# Patient Record
Sex: FEMALE | Race: WHITE | URBAN - METROPOLITAN AREA
[De-identification: names, ages, dates, MRNs, and addresses within clinical notes are randomized per-mention and may not be internally consistent; named-entity substitution may affect disease eponyms.]

---

## 2018-04-26 ENCOUNTER — EMPLOYEE HEALTH (OUTPATIENT)
Dept: OCCUPATIONAL MEDICINE | Age: 28
End: 2018-04-26
Attending: PHYSICIAN ASSISTANT

## 2022-01-18 ENCOUNTER — NURSE ONLY (OUTPATIENT)
Dept: INTERNAL MEDICINE CLINIC | Facility: HOSPITAL | Age: 32
End: 2022-01-18
Attending: EMERGENCY MEDICINE

## 2022-01-18 DIAGNOSIS — Z00.00 WELLNESS EXAMINATION: Primary | ICD-10-CM

## 2022-01-18 PROCEDURE — 86480 TB TEST CELL IMMUN MEASURE: CPT

## 2022-01-20 LAB
M TB IFN-G CD4+ T-CELLS BLD-ACNC: 0 IU/ML
M TB TUBERC IFN-G BLD QL: NEGATIVE
M TB TUBERC IGNF/MITOGEN IGNF CONTROL: >10 IU/ML
QFT TB1 AG MINUS NIL: 0.02 IU/ML
QFT TB2 AG MINUS NIL: 0 IU/ML

## 2022-03-17 ENCOUNTER — OFFICE VISIT (OUTPATIENT)
Dept: FAMILY MEDICINE CLINIC | Facility: CLINIC | Age: 32
End: 2022-03-17
Payer: COMMERCIAL

## 2022-03-17 VITALS
HEART RATE: 72 BPM | RESPIRATION RATE: 16 BRPM | WEIGHT: 176 LBS | TEMPERATURE: 98 F | DIASTOLIC BLOOD PRESSURE: 70 MMHG | OXYGEN SATURATION: 98 % | BODY MASS INDEX: 28.28 KG/M2 | SYSTOLIC BLOOD PRESSURE: 110 MMHG | HEIGHT: 66 IN

## 2022-03-17 DIAGNOSIS — Z00.00 LABORATORY TESTS ORDERED AS PART OF A COMPLETE PHYSICAL EXAM (CPE): ICD-10-CM

## 2022-03-17 DIAGNOSIS — R80.2: ICD-10-CM

## 2022-03-17 DIAGNOSIS — Z76.89 ENCOUNTER TO ESTABLISH CARE: ICD-10-CM

## 2022-03-17 DIAGNOSIS — Z00.00 WELL ADULT EXAM: Primary | ICD-10-CM

## 2022-03-17 PROCEDURE — 3074F SYST BP LT 130 MM HG: CPT | Performed by: FAMILY MEDICINE

## 2022-03-17 PROCEDURE — 99385 PREV VISIT NEW AGE 18-39: CPT | Performed by: FAMILY MEDICINE

## 2022-03-17 PROCEDURE — 3078F DIAST BP <80 MM HG: CPT | Performed by: FAMILY MEDICINE

## 2022-03-17 PROCEDURE — 3008F BODY MASS INDEX DOCD: CPT | Performed by: FAMILY MEDICINE

## 2022-04-19 ENCOUNTER — LAB ENCOUNTER (OUTPATIENT)
Dept: LAB | Age: 32
End: 2022-04-19
Attending: FAMILY MEDICINE
Payer: COMMERCIAL

## 2022-04-19 DIAGNOSIS — Z00.00 WELL ADULT EXAM: ICD-10-CM

## 2022-04-19 DIAGNOSIS — Z00.00 LABORATORY TESTS ORDERED AS PART OF A COMPLETE PHYSICAL EXAM (CPE): ICD-10-CM

## 2022-04-19 LAB
ALBUMIN SERPL-MCNC: 3.7 G/DL (ref 3.4–5)
ALBUMIN/GLOB SERPL: 1.2 {RATIO} (ref 1–2)
ALP LIVER SERPL-CCNC: 54 U/L
ALT SERPL-CCNC: 21 U/L
ANION GAP SERPL CALC-SCNC: 5 MMOL/L (ref 0–18)
AST SERPL-CCNC: 10 U/L (ref 15–37)
BASOPHILS # BLD AUTO: 0.04 X10(3) UL (ref 0–0.2)
BASOPHILS NFR BLD AUTO: 0.6 %
BILIRUB SERPL-MCNC: 0.7 MG/DL (ref 0.1–2)
BUN BLD-MCNC: 8 MG/DL (ref 7–18)
CALCIUM BLD-MCNC: 9.3 MG/DL (ref 8.5–10.1)
CHLORIDE SERPL-SCNC: 109 MMOL/L (ref 98–112)
CHOLEST SERPL-MCNC: 143 MG/DL (ref ?–200)
CO2 SERPL-SCNC: 25 MMOL/L (ref 21–32)
CREAT BLD-MCNC: 0.67 MG/DL
EOSINOPHIL # BLD AUTO: 0.07 X10(3) UL (ref 0–0.7)
EOSINOPHIL NFR BLD AUTO: 1 %
ERYTHROCYTE [DISTWIDTH] IN BLOOD BY AUTOMATED COUNT: 12.4 %
FASTING PATIENT LIPID ANSWER: YES
FASTING STATUS PATIENT QL REPORTED: YES
GLOBULIN PLAS-MCNC: 3.2 G/DL (ref 2.8–4.4)
GLUCOSE BLD-MCNC: 94 MG/DL (ref 70–99)
HCT VFR BLD AUTO: 44 %
HDLC SERPL-MCNC: 63 MG/DL (ref 40–59)
HGB BLD-MCNC: 13.9 G/DL
IMM GRANULOCYTES # BLD AUTO: 0.03 X10(3) UL (ref 0–1)
IMM GRANULOCYTES NFR BLD: 0.4 %
LDLC SERPL CALC-MCNC: 69 MG/DL (ref ?–100)
LYMPHOCYTES # BLD AUTO: 2.42 X10(3) UL (ref 1–4)
LYMPHOCYTES NFR BLD AUTO: 33.8 %
MCH RBC QN AUTO: 30.1 PG (ref 26–34)
MCHC RBC AUTO-ENTMCNC: 31.6 G/DL (ref 31–37)
MCV RBC AUTO: 95.2 FL
MONOCYTES # BLD AUTO: 0.61 X10(3) UL (ref 0.1–1)
MONOCYTES NFR BLD AUTO: 8.5 %
NEUTROPHILS # BLD AUTO: 4 X10 (3) UL (ref 1.5–7.7)
NEUTROPHILS # BLD AUTO: 4 X10(3) UL (ref 1.5–7.7)
NEUTROPHILS NFR BLD AUTO: 55.7 %
NONHDLC SERPL-MCNC: 80 MG/DL (ref ?–130)
OSMOLALITY SERPL CALC.SUM OF ELEC: 286 MOSM/KG (ref 275–295)
PLATELET # BLD AUTO: 295 10(3)UL (ref 150–450)
POTASSIUM SERPL-SCNC: 4.5 MMOL/L (ref 3.5–5.1)
PROT SERPL-MCNC: 6.9 G/DL (ref 6.4–8.2)
RBC # BLD AUTO: 4.62 X10(6)UL
SODIUM SERPL-SCNC: 139 MMOL/L (ref 136–145)
T4 FREE SERPL-MCNC: 1.1 NG/DL (ref 0.8–1.7)
TRIGL SERPL-MCNC: 47 MG/DL (ref 30–149)
TSI SER-ACNC: 1.8 MIU/ML (ref 0.36–3.74)
VLDLC SERPL CALC-MCNC: 7 MG/DL (ref 0–30)
WBC # BLD AUTO: 7.2 X10(3) UL (ref 4–11)

## 2022-04-19 PROCEDURE — 84439 ASSAY OF FREE THYROXINE: CPT

## 2022-04-19 PROCEDURE — 85025 COMPLETE CBC W/AUTO DIFF WBC: CPT

## 2022-04-19 PROCEDURE — 84443 ASSAY THYROID STIM HORMONE: CPT

## 2022-04-19 PROCEDURE — 36415 COLL VENOUS BLD VENIPUNCTURE: CPT

## 2022-04-19 PROCEDURE — 80053 COMPREHEN METABOLIC PANEL: CPT

## 2022-04-19 PROCEDURE — 80061 LIPID PANEL: CPT

## 2022-07-12 ENCOUNTER — OFFICE VISIT (OUTPATIENT)
Dept: OBGYN CLINIC | Facility: CLINIC | Age: 32
End: 2022-07-12
Payer: COMMERCIAL

## 2022-07-12 VITALS
BODY MASS INDEX: 28.19 KG/M2 | HEART RATE: 114 BPM | SYSTOLIC BLOOD PRESSURE: 122 MMHG | DIASTOLIC BLOOD PRESSURE: 78 MMHG | HEIGHT: 66 IN | WEIGHT: 175.38 LBS

## 2022-07-12 DIAGNOSIS — N89.8 VAGINAL DISCHARGE: ICD-10-CM

## 2022-07-12 DIAGNOSIS — Z01.419 ENCOUNTER FOR WELL WOMAN EXAM WITH ROUTINE GYNECOLOGICAL EXAM: Primary | ICD-10-CM

## 2022-07-12 DIAGNOSIS — Z30.09 ENCOUNTER FOR COUNSELING REGARDING CONTRACEPTION: ICD-10-CM

## 2022-07-12 DIAGNOSIS — Z12.4 SCREENING FOR CERVICAL CANCER: ICD-10-CM

## 2022-07-12 PROCEDURE — 87480 CANDIDA DNA DIR PROBE: CPT | Performed by: OBSTETRICS & GYNECOLOGY

## 2022-07-12 PROCEDURE — 3008F BODY MASS INDEX DOCD: CPT | Performed by: OBSTETRICS & GYNECOLOGY

## 2022-07-12 PROCEDURE — 87660 TRICHOMONAS VAGIN DIR PROBE: CPT | Performed by: OBSTETRICS & GYNECOLOGY

## 2022-07-12 PROCEDURE — 87624 HPV HI-RISK TYP POOLED RSLT: CPT | Performed by: OBSTETRICS & GYNECOLOGY

## 2022-07-12 PROCEDURE — 99385 PREV VISIT NEW AGE 18-39: CPT | Performed by: OBSTETRICS & GYNECOLOGY

## 2022-07-12 PROCEDURE — 3078F DIAST BP <80 MM HG: CPT | Performed by: OBSTETRICS & GYNECOLOGY

## 2022-07-12 PROCEDURE — 87510 GARDNER VAG DNA DIR PROBE: CPT | Performed by: OBSTETRICS & GYNECOLOGY

## 2022-07-12 PROCEDURE — 3074F SYST BP LT 130 MM HG: CPT | Performed by: OBSTETRICS & GYNECOLOGY

## 2022-07-12 RX ORDER — LEVOCETIRIZINE DIHYDROCHLORIDE 5 MG/1
TABLET, FILM COATED ORAL
COMMUNITY
Start: 2022-06-01

## 2022-07-13 LAB — HPV I/H RISK 1 DNA SPEC QL NAA+PROBE: NEGATIVE

## 2022-07-19 PROBLEM — R87.612 LGSIL ON PAP SMEAR OF CERVIX: Status: ACTIVE | Noted: 2022-07-19

## 2022-09-07 ENCOUNTER — TELEPHONE (OUTPATIENT)
Dept: OBGYN CLINIC | Facility: CLINIC | Age: 32
End: 2022-09-07

## 2022-09-07 NOTE — TELEPHONE ENCOUNTER
Patient stated she is having some bleeding and noticed bumps in vaginal area.  Please give patient a call

## 2022-09-19 ENCOUNTER — TELEPHONE (OUTPATIENT)
Dept: INTERNAL MEDICINE CLINIC | Facility: HOSPITAL | Age: 32
End: 2022-09-19

## 2022-09-19 NOTE — TELEPHONE ENCOUNTER
9/19 Dashboard submission reporting exposure to patient on 9/15 who tested positive for covid. Employee is asymptomatic and fully vaccinated and boosted so no testing required per policy unless symptoms develop. 3

## 2022-11-02 ENCOUNTER — TELEPHONE (OUTPATIENT)
Dept: FAMILY MEDICINE CLINIC | Facility: CLINIC | Age: 32
End: 2022-11-02

## 2022-11-02 NOTE — TELEPHONE ENCOUNTER
Pt dropped of a Health Screening Form to be completed by the doctor. She had her Annual on 3/17/22. The form was placed in Dr. Jojo Kolb folder.

## 2022-11-03 NOTE — TELEPHONE ENCOUNTER
LMOM for pt that form was completed by physician. Copy faxed to [phone]. Copy placed in blue accordion by  copy/fax machine, and copy placed in patient .

## 2022-11-28 ENCOUNTER — LAB ENCOUNTER (OUTPATIENT)
Dept: LAB | Facility: HOSPITAL | Age: 32
End: 2022-11-28
Attending: PREVENTIVE MEDICINE
Payer: COMMERCIAL

## 2022-11-28 ENCOUNTER — TELEPHONE (OUTPATIENT)
Dept: INTERNAL MEDICINE CLINIC | Facility: HOSPITAL | Age: 32
End: 2022-11-28

## 2022-11-28 DIAGNOSIS — Z20.822 SUSPECTED COVID-19 VIRUS INFECTION: ICD-10-CM

## 2022-11-28 DIAGNOSIS — Z20.822 SUSPECTED COVID-19 VIRUS INFECTION: Primary | ICD-10-CM

## 2022-11-28 LAB — SARS-COV-2 RNA RESP QL NAA+PROBE: NOT DETECTED

## 2022-11-29 NOTE — TELEPHONE ENCOUNTER
Results and RTW guidelines:    COVID RESULT:    [x] Viewed by employee in [address] RTW plan and instructions as indicated on triage call. Manager notified. Estimated RTW date: 11/28/2022  [] Discussed with employee   [] Unable to reach by phone.   Sent via [employer] message      Test type:    [x] Rapid         [] Alinity         [] Outside test:       [x] NEGATIVE     Ordered Alinity retest?  []Yes   [x] No (skip to RTW)   Ordered Rapid retest?   []Yes   [x] No (skip to RTW)           Dated to be taken:      If Yes, PLACE ORDER NOW and instruct the following:  -Originally Symptomatic or Now Symptoms:   -RTW when sx improve- fever free for 24 hours w/o medications, Diarrhea/Vomiting for 24 hours w/o medications    -Originally  Asymptomatic  -Asymptomatic AND Vaccinated or Unvaccinated or Prior infection in past 90 days:   -May work and continue to monitor symptoms for the next 14 days.                                         -Rapid test day 2, rapid test day 5 (day 0 - exposure)

## 2022-12-18 ENCOUNTER — OFFICE VISIT (OUTPATIENT)
Dept: FAMILY MEDICINE CLINIC | Facility: CLINIC | Age: 32
End: 2022-12-18
Payer: COMMERCIAL

## 2022-12-18 VITALS
BODY MASS INDEX: 27.32 KG/M2 | DIASTOLIC BLOOD PRESSURE: 80 MMHG | RESPIRATION RATE: 18 BRPM | OXYGEN SATURATION: 98 % | HEART RATE: 94 BPM | HEIGHT: 66 IN | TEMPERATURE: 98 F | SYSTOLIC BLOOD PRESSURE: 118 MMHG | WEIGHT: 170 LBS

## 2022-12-18 DIAGNOSIS — J01.00 ACUTE NON-RECURRENT MAXILLARY SINUSITIS: ICD-10-CM

## 2022-12-18 DIAGNOSIS — R05.1 ACUTE COUGH: Primary | ICD-10-CM

## 2022-12-18 PROCEDURE — 3008F BODY MASS INDEX DOCD: CPT | Performed by: FAMILY MEDICINE

## 2022-12-18 PROCEDURE — 87637 SARSCOV2&INF A&B&RSV AMP PRB: CPT | Performed by: FAMILY MEDICINE

## 2022-12-18 PROCEDURE — 99213 OFFICE O/P EST LOW 20 MIN: CPT | Performed by: FAMILY MEDICINE

## 2022-12-18 PROCEDURE — 3074F SYST BP LT 130 MM HG: CPT | Performed by: FAMILY MEDICINE

## 2022-12-18 PROCEDURE — 3079F DIAST BP 80-89 MM HG: CPT | Performed by: FAMILY MEDICINE

## 2022-12-18 RX ORDER — ALBUTEROL SULFATE 90 UG/1
2 AEROSOL, METERED RESPIRATORY (INHALATION) EVERY 4 HOURS PRN
Qty: 18 G | Refills: 0 | Status: SHIPPED | OUTPATIENT
Start: 2022-12-18

## 2022-12-18 RX ORDER — CEPHALEXIN 500 MG/1
500 CAPSULE ORAL 2 TIMES DAILY
Qty: 20 CAPSULE | Refills: 0 | Status: SHIPPED | OUTPATIENT
Start: 2022-12-18 | End: 2022-12-19

## 2022-12-18 NOTE — PATIENT INSTRUCTIONS
Take antibiotics with food and plenty of water. Eat yogurt or take probiotic daily. (Flacada Model is a good example of an OTC probiotic)  Make sure to finish the entire antibiotic treatment. Increase fluids and rest.   Use albuterol inhaler as needed for cough/chest heaviness. Use OTC meds for comfort as needed--  Ibuprofen/Tylenol for fever/pain  Use Benadryl at bedtime to reduce drainage and promote rest.  Zyrtec/Claritin/Allegra in the AM to reduce nasal drainage without sedation. Use saline nasal sprays to reduce congestion and thin secretions. Use Delsym for cough. Consider applying jodie's vapo-rub or eucayptus oil to chest and feet at bedtime to reduce chest and nasal congestion. Warm tea with honey, cough lozenges, vaporizers/steam etc.    Monitor symptoms and contact the office if no better in 2-3 days.

## 2022-12-19 LAB
FLUAV + FLUBV RNA SPEC NAA+PROBE: NOT DETECTED
FLUAV + FLUBV RNA SPEC NAA+PROBE: NOT DETECTED
RSV RNA SPEC NAA+PROBE: NOT DETECTED
SARS-COV-2 RNA RESP QL NAA+PROBE: DETECTED

## 2022-12-19 RX ORDER — DOXYCYCLINE HYCLATE 100 MG/1
100 CAPSULE ORAL 2 TIMES DAILY
Qty: 20 CAPSULE | Refills: 0 | Status: SHIPPED | OUTPATIENT
Start: 2022-12-19 | End: 2022-12-29

## 2022-12-19 NOTE — PROGRESS NOTES
Pt with rash after taking keflex. Pt to stop keflex. Has been taking benadryl. No swelling of mouth or tongue, no breathing problems. Will rx doxycycline. Pt will wait for a few days and see if she feels abx are necessary. \  Will use otcs for now and monitor. Quad panel is pending.

## 2022-12-20 ENCOUNTER — TELEPHONE (OUTPATIENT)
Dept: INTERNAL MEDICINE CLINIC | Facility: HOSPITAL | Age: 32
End: 2022-12-20

## 2022-12-22 ENCOUNTER — PATIENT MESSAGE (OUTPATIENT)
Dept: FAMILY MEDICINE CLINIC | Facility: CLINIC | Age: 32
End: 2022-12-22

## 2022-12-23 NOTE — TELEPHONE ENCOUNTER
From: Livia Chan  To: Meagan Sales DO  Sent: 12/22/2022 6:58 PM CST  Subject: Medication    Hi Janneth Gomez wondering if you have any medication suggestions for a cough Shruthi had for the past month. I was sick the week after Thanksgiving (rapid covid was negative) I felt better overall by the end of the week, but since then continued to have a consistent cough with phlegm (nothing actually comes up when I cough). I went to walk-in care on 12/18 because I also started having shortness of breath, sore throat and runny nose. I was recommended Delsym and allergy medication to help with the cough. I tested positive for covid this time. My other symptoms have resolved and while the medications have helped suppress my cough, it is still there. Should I continue taking Delsym/allergy medications or is there something else you recommend?     Thank you,    Livia Chan

## 2022-12-27 ENCOUNTER — TELEMEDICINE (OUTPATIENT)
Dept: TELEHEALTH | Age: 32
End: 2022-12-27

## 2022-12-27 DIAGNOSIS — Z86.16 HISTORY OF COVID-19: ICD-10-CM

## 2022-12-27 DIAGNOSIS — R05.8 POST-VIRAL COUGH SYNDROME: Primary | ICD-10-CM

## 2022-12-27 PROCEDURE — 99213 OFFICE O/P EST LOW 20 MIN: CPT | Performed by: PHYSICIAN ASSISTANT

## 2022-12-27 RX ORDER — BENZONATATE 200 MG/1
200 CAPSULE ORAL 3 TIMES DAILY PRN
Qty: 30 CAPSULE | Refills: 0 | Status: SHIPPED | OUTPATIENT
Start: 2022-12-27

## 2022-12-27 RX ORDER — PREDNISONE 20 MG/1
40 TABLET ORAL DAILY
Qty: 10 TABLET | Refills: 0 | Status: SHIPPED | OUTPATIENT
Start: 2022-12-27 | End: 2023-01-01

## 2023-05-01 ENCOUNTER — TELEPHONE (OUTPATIENT)
Dept: FAMILY MEDICINE CLINIC | Facility: CLINIC | Age: 33
End: 2023-05-01

## 2023-05-01 DIAGNOSIS — Z00.00 LABORATORY EXAMINATION ORDERED AS PART OF A ROUTINE GENERAL MEDICAL EXAMINATION: Primary | ICD-10-CM

## 2023-05-03 ENCOUNTER — LAB ENCOUNTER (OUTPATIENT)
Dept: LAB | Age: 33
End: 2023-05-03
Attending: FAMILY MEDICINE
Payer: COMMERCIAL

## 2023-05-03 DIAGNOSIS — Z00.00 LABORATORY EXAMINATION ORDERED AS PART OF A ROUTINE GENERAL MEDICAL EXAMINATION: ICD-10-CM

## 2023-05-03 LAB
ALBUMIN SERPL-MCNC: 3.2 G/DL (ref 3.4–5)
ALBUMIN/GLOB SERPL: 0.8 {RATIO} (ref 1–2)
ALP LIVER SERPL-CCNC: 46 U/L
ALT SERPL-CCNC: 19 U/L
ANION GAP SERPL CALC-SCNC: 3 MMOL/L (ref 0–18)
AST SERPL-CCNC: 14 U/L (ref 15–37)
BASOPHILS # BLD AUTO: 0.06 X10(3) UL (ref 0–0.2)
BASOPHILS NFR BLD AUTO: 0.6 %
BILIRUB SERPL-MCNC: 0.3 MG/DL (ref 0.1–2)
BUN BLD-MCNC: 11 MG/DL (ref 7–18)
CALCIUM BLD-MCNC: 9 MG/DL (ref 8.5–10.1)
CHLORIDE SERPL-SCNC: 111 MMOL/L (ref 98–112)
CHOLEST SERPL-MCNC: 186 MG/DL (ref ?–200)
CO2 SERPL-SCNC: 22 MMOL/L (ref 21–32)
CREAT BLD-MCNC: 0.7 MG/DL
EOSINOPHIL # BLD AUTO: 0.14 X10(3) UL (ref 0–0.7)
EOSINOPHIL NFR BLD AUTO: 1.5 %
ERYTHROCYTE [DISTWIDTH] IN BLOOD BY AUTOMATED COUNT: 12.1 %
FASTING PATIENT LIPID ANSWER: YES
FASTING STATUS PATIENT QL REPORTED: YES
GFR SERPLBLD BASED ON 1.73 SQ M-ARVRAT: 118 ML/MIN/1.73M2 (ref 60–?)
GLOBULIN PLAS-MCNC: 3.8 G/DL (ref 2.8–4.4)
GLUCOSE BLD-MCNC: 100 MG/DL (ref 70–99)
HCT VFR BLD AUTO: 41.1 %
HDLC SERPL-MCNC: 83 MG/DL (ref 40–59)
HGB BLD-MCNC: 13.6 G/DL
IMM GRANULOCYTES # BLD AUTO: 0.03 X10(3) UL (ref 0–1)
IMM GRANULOCYTES NFR BLD: 0.3 %
LDLC SERPL CALC-MCNC: 87 MG/DL (ref ?–100)
LYMPHOCYTES # BLD AUTO: 3.92 X10(3) UL (ref 1–4)
LYMPHOCYTES NFR BLD AUTO: 41.4 %
MCH RBC QN AUTO: 30.6 PG (ref 26–34)
MCHC RBC AUTO-ENTMCNC: 33.1 G/DL (ref 31–37)
MCV RBC AUTO: 92.4 FL
MONOCYTES # BLD AUTO: 0.72 X10(3) UL (ref 0.1–1)
MONOCYTES NFR BLD AUTO: 7.6 %
NEUTROPHILS # BLD AUTO: 4.6 X10 (3) UL (ref 1.5–7.7)
NEUTROPHILS # BLD AUTO: 4.6 X10(3) UL (ref 1.5–7.7)
NEUTROPHILS NFR BLD AUTO: 48.6 %
NONHDLC SERPL-MCNC: 103 MG/DL (ref ?–130)
OSMOLALITY SERPL CALC.SUM OF ELEC: 281 MOSM/KG (ref 275–295)
PLATELET # BLD AUTO: 304 10(3)UL (ref 150–450)
POTASSIUM SERPL-SCNC: 4.3 MMOL/L (ref 3.5–5.1)
PROT SERPL-MCNC: 7 G/DL (ref 6.4–8.2)
RBC # BLD AUTO: 4.45 X10(6)UL
SODIUM SERPL-SCNC: 136 MMOL/L (ref 136–145)
T4 FREE SERPL-MCNC: 1 NG/DL (ref 0.8–1.7)
TRIGL SERPL-MCNC: 92 MG/DL (ref 30–149)
TSI SER-ACNC: 5.71 MIU/ML (ref 0.36–3.74)
VLDLC SERPL CALC-MCNC: 15 MG/DL (ref 0–30)
WBC # BLD AUTO: 9.5 X10(3) UL (ref 4–11)

## 2023-05-03 PROCEDURE — 80061 LIPID PANEL: CPT

## 2023-05-03 PROCEDURE — 85025 COMPLETE CBC W/AUTO DIFF WBC: CPT

## 2023-05-03 PROCEDURE — 36415 COLL VENOUS BLD VENIPUNCTURE: CPT

## 2023-05-03 PROCEDURE — 80053 COMPREHEN METABOLIC PANEL: CPT

## 2023-05-03 PROCEDURE — 84439 ASSAY OF FREE THYROXINE: CPT

## 2023-05-03 PROCEDURE — 84443 ASSAY THYROID STIM HORMONE: CPT

## 2023-05-06 ENCOUNTER — HOSPITAL ENCOUNTER (OUTPATIENT)
Age: 33
Discharge: HOME | End: 2023-05-06
Payer: COMMERCIAL

## 2023-05-06 ENCOUNTER — APPOINTMENT (OUTPATIENT)
Dept: CT IMAGING | Age: 33
End: 2023-05-06
Attending: PHYSICIAN ASSISTANT
Payer: COMMERCIAL

## 2023-05-06 VITALS
RESPIRATION RATE: 16 BRPM | TEMPERATURE: 98 F | OXYGEN SATURATION: 99 % | DIASTOLIC BLOOD PRESSURE: 62 MMHG | SYSTOLIC BLOOD PRESSURE: 116 MMHG | HEART RATE: 84 BPM

## 2023-05-06 DIAGNOSIS — K80.20 CALCULUS OF GALLBLADDER WITHOUT CHOLECYSTITIS WITHOUT OBSTRUCTION: Primary | ICD-10-CM

## 2023-05-06 DIAGNOSIS — N39.0 URINARY TRACT INFECTION WITHOUT HEMATURIA, SITE UNSPECIFIED: ICD-10-CM

## 2023-05-06 LAB
#MXD IC: 0.6 X10ˆ3/UL (ref 0.1–1)
B-HCG UR QL: NEGATIVE
BUN BLD-MCNC: 9 MG/DL (ref 7–18)
CHLORIDE BLD-SCNC: 102 MMOL/L (ref 98–112)
CO2 BLD-SCNC: 26 MMOL/L (ref 21–32)
CREAT BLD-MCNC: 0.6 MG/DL
GFR SERPLBLD BASED ON 1.73 SQ M-ARVRAT: 122 ML/MIN/1.73M2 (ref 60–?)
GLUCOSE BLD-MCNC: 94 MG/DL (ref 70–99)
HCT VFR BLD AUTO: 42.9 %
HCT VFR BLD CALC: 46 %
HGB BLD-MCNC: 14 G/DL
ISTAT IONIZED CALCIUM FOR CHEM 8: 1.2 MMOL/L (ref 1.12–1.32)
LYMPHOCYTES # BLD AUTO: 2.2 X10ˆ3/UL (ref 1–4)
LYMPHOCYTES NFR BLD AUTO: 29.9 %
MCH RBC QN AUTO: 29.9 PG (ref 26–34)
MCHC RBC AUTO-ENTMCNC: 32.6 G/DL (ref 31–37)
MCV RBC AUTO: 91.5 FL (ref 80–100)
MIXED CELL %: 7.7 %
NEUTROPHILS # BLD AUTO: 4.6 X10ˆ3/UL (ref 1.5–7.7)
NEUTROPHILS NFR BLD AUTO: 62.4 %
PLATELET # BLD AUTO: 303 X10ˆ3/UL (ref 150–450)
POCT GLUCOSE URINE: NEGATIVE MG/DL
POCT KETONE URINE: 40 MG/DL
POCT NITRITE URINE: NEGATIVE
POCT PH URINE: 6 (ref 5–8)
POCT PROTEIN URINE: 30 MG/DL
POCT SPECIFIC GRAVITY URINE: 1.03
POCT URINE CLARITY: CLEAR
POCT UROBILINOGEN URINE: 1 MG/DL
POTASSIUM BLD-SCNC: 3.9 MMOL/L (ref 3.6–5.1)
RBC # BLD AUTO: 4.69 X10ˆ6/UL
SODIUM BLD-SCNC: 138 MMOL/L (ref 136–145)
WBC # BLD AUTO: 7.4 X10ˆ3/UL (ref 4–11)

## 2023-05-06 PROCEDURE — 85025 COMPLETE CBC W/AUTO DIFF WBC: CPT | Performed by: PHYSICIAN ASSISTANT

## 2023-05-06 PROCEDURE — 74177 CT ABD & PELVIS W/CONTRAST: CPT | Performed by: PHYSICIAN ASSISTANT

## 2023-05-06 PROCEDURE — 81025 URINE PREGNANCY TEST: CPT

## 2023-05-06 PROCEDURE — 96360 HYDRATION IV INFUSION INIT: CPT

## 2023-05-06 PROCEDURE — 80047 BASIC METABLC PNL IONIZED CA: CPT

## 2023-05-06 PROCEDURE — 87086 URINE CULTURE/COLONY COUNT: CPT | Performed by: PHYSICIAN ASSISTANT

## 2023-05-06 PROCEDURE — 81002 URINALYSIS NONAUTO W/O SCOPE: CPT | Performed by: PHYSICIAN ASSISTANT

## 2023-05-06 PROCEDURE — 99205 OFFICE O/P NEW HI 60 MIN: CPT

## 2023-05-06 PROCEDURE — 96361 HYDRATE IV INFUSION ADD-ON: CPT

## 2023-05-06 PROCEDURE — 99215 OFFICE O/P EST HI 40 MIN: CPT

## 2023-05-06 RX ORDER — ONDANSETRON 4 MG/1
4 TABLET, ORALLY DISINTEGRATING ORAL EVERY 4 HOURS PRN
Qty: 10 TABLET | Refills: 0 | Status: SHIPPED | OUTPATIENT
Start: 2023-05-06 | End: 2023-05-13

## 2023-05-06 RX ORDER — NITROFURANTOIN 25; 75 MG/1; MG/1
100 CAPSULE ORAL 2 TIMES DAILY
Qty: 10 CAPSULE | Refills: 0 | Status: SHIPPED | OUTPATIENT
Start: 2023-05-06 | End: 2023-05-11

## 2023-05-06 RX ORDER — SODIUM CHLORIDE 9 MG/ML
1000 INJECTION, SOLUTION INTRAVENOUS ONCE
Status: COMPLETED | OUTPATIENT
Start: 2023-05-06 | End: 2023-05-06

## 2023-05-06 RX ORDER — FLUCONAZOLE 150 MG/1
150 TABLET ORAL ONCE
Qty: 1 TABLET | Refills: 0 | Status: SHIPPED | OUTPATIENT
Start: 2023-05-06 | End: 2023-05-09

## 2023-05-06 NOTE — ED INITIAL ASSESSMENT (HPI)
Pt here w/ c/o right sided abd pain.  Has had episodes of diarrhea intermittently but now also RLQ pain w/ the RUP and back pain

## 2023-05-06 NOTE — DISCHARGE INSTRUCTIONS
Take antibiotic as prescribed for your UTI. You have been given surgery follow-up for your gallstones. Recommend a low-fat diet. Go to ER with any new or worsening symptoms.

## 2023-05-08 ENCOUNTER — OFFICE VISIT (OUTPATIENT)
Dept: FAMILY MEDICINE CLINIC | Facility: CLINIC | Age: 33
End: 2023-05-08
Payer: COMMERCIAL

## 2023-05-08 VITALS
TEMPERATURE: 98 F | OXYGEN SATURATION: 98 % | BODY MASS INDEX: 24.43 KG/M2 | RESPIRATION RATE: 14 BRPM | HEART RATE: 84 BPM | WEIGHT: 152 LBS | SYSTOLIC BLOOD PRESSURE: 122 MMHG | HEIGHT: 66 IN | DIASTOLIC BLOOD PRESSURE: 82 MMHG

## 2023-05-08 DIAGNOSIS — N64.4 BREAST PAIN: Primary | ICD-10-CM

## 2023-05-08 DIAGNOSIS — N30.00 ACUTE CYSTITIS WITHOUT HEMATURIA: ICD-10-CM

## 2023-05-08 DIAGNOSIS — K80.20 GALLBLADDER COLIC: ICD-10-CM

## 2023-05-08 PROCEDURE — 99214 OFFICE O/P EST MOD 30 MIN: CPT | Performed by: FAMILY MEDICINE

## 2023-05-08 PROCEDURE — 3074F SYST BP LT 130 MM HG: CPT | Performed by: FAMILY MEDICINE

## 2023-05-08 PROCEDURE — 3008F BODY MASS INDEX DOCD: CPT | Performed by: FAMILY MEDICINE

## 2023-05-08 PROCEDURE — 3079F DIAST BP 80-89 MM HG: CPT | Performed by: FAMILY MEDICINE

## 2023-05-08 RX ORDER — FLUCONAZOLE 150 MG/1
TABLET ORAL
COMMUNITY
Start: 2023-05-06

## 2023-05-09 ENCOUNTER — OFFICE VISIT (OUTPATIENT)
Dept: SURGERY | Facility: CLINIC | Age: 33
End: 2023-05-09

## 2023-05-09 VITALS — WEIGHT: 152 LBS | HEIGHT: 66 IN | BODY MASS INDEX: 24.43 KG/M2

## 2023-05-09 DIAGNOSIS — K80.20 CALCULUS OF GALLBLADDER WITHOUT CHOLECYSTITIS WITHOUT OBSTRUCTION: Primary | ICD-10-CM

## 2023-05-09 PROCEDURE — 99203 OFFICE O/P NEW LOW 30 MIN: CPT | Performed by: SURGERY

## 2023-05-09 PROCEDURE — 3008F BODY MASS INDEX DOCD: CPT | Performed by: SURGERY

## 2023-05-10 PROBLEM — R79.89 HIGH SERUM THYROID STIMULATING HORMONE (TSH): Status: ACTIVE | Noted: 2023-05-10

## 2023-05-11 ENCOUNTER — ANESTHESIA EVENT (OUTPATIENT)
Dept: SURGERY | Facility: HOSPITAL | Age: 33
End: 2023-05-11
Payer: COMMERCIAL

## 2023-05-11 ENCOUNTER — ANESTHESIA (OUTPATIENT)
Dept: SURGERY | Facility: HOSPITAL | Age: 33
End: 2023-05-11
Payer: COMMERCIAL

## 2023-05-11 ENCOUNTER — HOSPITAL ENCOUNTER (OUTPATIENT)
Facility: HOSPITAL | Age: 33
Setting detail: HOSPITAL OUTPATIENT SURGERY
Discharge: HOME | End: 2023-05-11
Attending: SURGERY | Admitting: SURGERY
Payer: COMMERCIAL

## 2023-05-11 VITALS
TEMPERATURE: 99 F | HEART RATE: 88 BPM | RESPIRATION RATE: 14 BRPM | HEIGHT: 66 IN | SYSTOLIC BLOOD PRESSURE: 128 MMHG | OXYGEN SATURATION: 100 % | DIASTOLIC BLOOD PRESSURE: 64 MMHG | BODY MASS INDEX: 24.27 KG/M2 | WEIGHT: 151 LBS

## 2023-05-11 DIAGNOSIS — K80.00 CALCULUS OF GALLBLADDER WITH ACUTE CHOLECYSTITIS WITHOUT OBSTRUCTION: ICD-10-CM

## 2023-05-11 LAB — B-HCG UR QL: NEGATIVE

## 2023-05-11 PROCEDURE — 0FT44ZZ RESECTION OF GALLBLADDER, PERCUTANEOUS ENDOSCOPIC APPROACH: ICD-10-PCS | Performed by: SURGERY

## 2023-05-11 PROCEDURE — 47562 LAPAROSCOPIC CHOLECYSTECTOMY: CPT | Performed by: SURGERY

## 2023-05-11 RX ORDER — EPHEDRINE SULFATE 50 MG/ML
INJECTION, SOLUTION INTRAVENOUS AS NEEDED
Status: DISCONTINUED | OUTPATIENT
Start: 2023-05-11 | End: 2023-05-11 | Stop reason: SURG

## 2023-05-11 RX ORDER — SODIUM CHLORIDE, SODIUM LACTATE, POTASSIUM CHLORIDE, CALCIUM CHLORIDE 600; 310; 30; 20 MG/100ML; MG/100ML; MG/100ML; MG/100ML
INJECTION, SOLUTION INTRAVENOUS CONTINUOUS
Status: DISCONTINUED | OUTPATIENT
Start: 2023-05-11 | End: 2023-05-11

## 2023-05-11 RX ORDER — KETOROLAC TROMETHAMINE 30 MG/ML
INJECTION, SOLUTION INTRAMUSCULAR; INTRAVENOUS AS NEEDED
Status: DISCONTINUED | OUTPATIENT
Start: 2023-05-11 | End: 2023-05-11 | Stop reason: SURG

## 2023-05-11 RX ORDER — HYDROMORPHONE HYDROCHLORIDE 1 MG/ML
0.4 INJECTION, SOLUTION INTRAMUSCULAR; INTRAVENOUS; SUBCUTANEOUS EVERY 5 MIN PRN
Status: DISCONTINUED | OUTPATIENT
Start: 2023-05-11 | End: 2023-05-11

## 2023-05-11 RX ORDER — POLYETHYLENE GLYCOL 3350 17 G/17G
17 POWDER, FOR SOLUTION ORAL DAILY
Qty: 14 PACKET | Refills: 0 | Status: SHIPPED | OUTPATIENT
Start: 2023-05-11 | End: 2023-05-25

## 2023-05-11 RX ORDER — HYDROCODONE BITARTRATE AND ACETAMINOPHEN 5; 325 MG/1; MG/1
1 TABLET ORAL ONCE AS NEEDED
Status: DISCONTINUED | OUTPATIENT
Start: 2023-05-11 | End: 2023-05-11

## 2023-05-11 RX ORDER — LIDOCAINE HYDROCHLORIDE 10 MG/ML
INJECTION, SOLUTION EPIDURAL; INFILTRATION; INTRACAUDAL; PERINEURAL AS NEEDED
Status: DISCONTINUED | OUTPATIENT
Start: 2023-05-11 | End: 2023-05-11 | Stop reason: SURG

## 2023-05-11 RX ORDER — ONDANSETRON 4 MG/1
4 TABLET, FILM COATED ORAL EVERY 8 HOURS PRN
Qty: 15 TABLET | Refills: 0 | Status: SHIPPED | OUTPATIENT
Start: 2023-05-11

## 2023-05-11 RX ORDER — METOCLOPRAMIDE HYDROCHLORIDE 5 MG/ML
10 INJECTION INTRAMUSCULAR; INTRAVENOUS ONCE
Status: COMPLETED | OUTPATIENT
Start: 2023-05-11 | End: 2023-05-11

## 2023-05-11 RX ORDER — MORPHINE SULFATE 4 MG/ML
2 INJECTION, SOLUTION INTRAMUSCULAR; INTRAVENOUS EVERY 10 MIN PRN
Status: DISCONTINUED | OUTPATIENT
Start: 2023-05-11 | End: 2023-05-11

## 2023-05-11 RX ORDER — HYDROMORPHONE HYDROCHLORIDE 1 MG/ML
0.6 INJECTION, SOLUTION INTRAMUSCULAR; INTRAVENOUS; SUBCUTANEOUS EVERY 5 MIN PRN
Status: DISCONTINUED | OUTPATIENT
Start: 2023-05-11 | End: 2023-05-11

## 2023-05-11 RX ORDER — HYDROCODONE BITARTRATE AND ACETAMINOPHEN 5; 325 MG/1; MG/1
2 TABLET ORAL ONCE AS NEEDED
Status: DISCONTINUED | OUTPATIENT
Start: 2023-05-11 | End: 2023-05-11

## 2023-05-11 RX ORDER — ACETAMINOPHEN 500 MG
1000 TABLET ORAL ONCE AS NEEDED
Status: DISCONTINUED | OUTPATIENT
Start: 2023-05-11 | End: 2023-05-11

## 2023-05-11 RX ORDER — MIDAZOLAM HYDROCHLORIDE 1 MG/ML
INJECTION INTRAMUSCULAR; INTRAVENOUS AS NEEDED
Status: DISCONTINUED | OUTPATIENT
Start: 2023-05-11 | End: 2023-05-11 | Stop reason: SURG

## 2023-05-11 RX ORDER — ACETAMINOPHEN AND CODEINE PHOSPHATE 300; 30 MG/1; MG/1
1 TABLET ORAL EVERY 4 HOURS PRN
Status: DISCONTINUED | OUTPATIENT
Start: 2023-05-11 | End: 2023-05-11

## 2023-05-11 RX ORDER — ONDANSETRON 2 MG/ML
INJECTION INTRAMUSCULAR; INTRAVENOUS AS NEEDED
Status: DISCONTINUED | OUTPATIENT
Start: 2023-05-11 | End: 2023-05-11 | Stop reason: SURG

## 2023-05-11 RX ORDER — NALOXONE HYDROCHLORIDE 0.4 MG/ML
80 INJECTION, SOLUTION INTRAMUSCULAR; INTRAVENOUS; SUBCUTANEOUS AS NEEDED
Status: DISCONTINUED | OUTPATIENT
Start: 2023-05-11 | End: 2023-05-11

## 2023-05-11 RX ORDER — ONDANSETRON 2 MG/ML
4 INJECTION INTRAMUSCULAR; INTRAVENOUS EVERY 6 HOURS PRN
Status: DISCONTINUED | OUTPATIENT
Start: 2023-05-11 | End: 2023-05-11

## 2023-05-11 RX ORDER — SCOLOPAMINE TRANSDERMAL SYSTEM 1 MG/1
1 PATCH, EXTENDED RELEASE TRANSDERMAL ONCE
Status: DISCONTINUED | OUTPATIENT
Start: 2023-05-11 | End: 2023-05-11

## 2023-05-11 RX ORDER — MORPHINE SULFATE 4 MG/ML
4 INJECTION, SOLUTION INTRAMUSCULAR; INTRAVENOUS EVERY 10 MIN PRN
Status: DISCONTINUED | OUTPATIENT
Start: 2023-05-11 | End: 2023-05-11

## 2023-05-11 RX ORDER — SODIUM CHLORIDE 9 MG/ML
INJECTION, SOLUTION INTRAVENOUS CONTINUOUS PRN
Status: DISCONTINUED | OUTPATIENT
Start: 2023-05-11 | End: 2023-05-11 | Stop reason: SURG

## 2023-05-11 RX ORDER — BUPIVACAINE HYDROCHLORIDE 5 MG/ML
INJECTION, SOLUTION EPIDURAL; INTRACAUDAL AS NEEDED
Status: DISCONTINUED | OUTPATIENT
Start: 2023-05-11 | End: 2023-05-11 | Stop reason: HOSPADM

## 2023-05-11 RX ORDER — ACETAMINOPHEN 500 MG
1000 TABLET ORAL ONCE
Status: COMPLETED | OUTPATIENT
Start: 2023-05-11 | End: 2023-05-11

## 2023-05-11 RX ORDER — SODIUM CHLORIDE 9 MG/ML
INJECTION, SOLUTION INTRAVENOUS ONCE
Status: COMPLETED | OUTPATIENT
Start: 2023-05-11 | End: 2023-05-11

## 2023-05-11 RX ORDER — HYDROMORPHONE HYDROCHLORIDE 1 MG/ML
0.2 INJECTION, SOLUTION INTRAMUSCULAR; INTRAVENOUS; SUBCUTANEOUS EVERY 5 MIN PRN
Status: DISCONTINUED | OUTPATIENT
Start: 2023-05-11 | End: 2023-05-11

## 2023-05-11 RX ORDER — ROCURONIUM BROMIDE 10 MG/ML
INJECTION, SOLUTION INTRAVENOUS AS NEEDED
Status: DISCONTINUED | OUTPATIENT
Start: 2023-05-11 | End: 2023-05-11 | Stop reason: SURG

## 2023-05-11 RX ORDER — DEXAMETHASONE SODIUM PHOSPHATE 4 MG/ML
VIAL (ML) INJECTION AS NEEDED
Status: DISCONTINUED | OUTPATIENT
Start: 2023-05-11 | End: 2023-05-11 | Stop reason: SURG

## 2023-05-11 RX ORDER — MORPHINE SULFATE 10 MG/ML
6 INJECTION, SOLUTION INTRAMUSCULAR; INTRAVENOUS EVERY 10 MIN PRN
Status: DISCONTINUED | OUTPATIENT
Start: 2023-05-11 | End: 2023-05-11

## 2023-05-11 RX ORDER — CLINDAMYCIN PHOSPHATE 900 MG/50ML
900 INJECTION INTRAVENOUS ONCE
Status: COMPLETED | OUTPATIENT
Start: 2023-05-11 | End: 2023-05-11

## 2023-05-11 RX ORDER — PROCHLORPERAZINE EDISYLATE 5 MG/ML
5 INJECTION INTRAMUSCULAR; INTRAVENOUS EVERY 8 HOURS PRN
Status: DISCONTINUED | OUTPATIENT
Start: 2023-05-11 | End: 2023-05-11

## 2023-05-11 RX ORDER — ACETAMINOPHEN AND CODEINE PHOSPHATE 300; 30 MG/1; MG/1
1-2 TABLET ORAL EVERY 4 HOURS PRN
Qty: 30 TABLET | Refills: 0 | Status: SHIPPED | OUTPATIENT
Start: 2023-05-11

## 2023-05-11 RX ADMIN — EPHEDRINE SULFATE 10 MG: 50 INJECTION, SOLUTION INTRAVENOUS at 12:13:00

## 2023-05-11 RX ADMIN — LIDOCAINE HYDROCHLORIDE 50 MG: 10 INJECTION, SOLUTION EPIDURAL; INFILTRATION; INTRACAUDAL; PERINEURAL at 12:02:00

## 2023-05-11 RX ADMIN — KETOROLAC TROMETHAMINE 30 MG: 30 INJECTION, SOLUTION INTRAMUSCULAR; INTRAVENOUS at 12:26:00

## 2023-05-11 RX ADMIN — ROCURONIUM BROMIDE 50 MG: 10 INJECTION, SOLUTION INTRAVENOUS at 12:02:00

## 2023-05-11 RX ADMIN — SODIUM CHLORIDE: 9 INJECTION, SOLUTION INTRAVENOUS at 11:56:00

## 2023-05-11 RX ADMIN — ONDANSETRON 4 MG: 2 INJECTION INTRAMUSCULAR; INTRAVENOUS at 12:26:00

## 2023-05-11 RX ADMIN — MIDAZOLAM HYDROCHLORIDE 2 MG: 1 INJECTION INTRAMUSCULAR; INTRAVENOUS at 11:56:00

## 2023-05-11 RX ADMIN — SODIUM CHLORIDE: 9 INJECTION, SOLUTION INTRAVENOUS at 12:44:00

## 2023-05-11 RX ADMIN — EPHEDRINE SULFATE 5 MG: 50 INJECTION, SOLUTION INTRAVENOUS at 12:14:00

## 2023-05-11 RX ADMIN — DEXAMETHASONE SODIUM PHOSPHATE 8 MG: 4 MG/ML VIAL (ML) INJECTION at 12:10:00

## 2023-05-11 RX ADMIN — CLINDAMYCIN PHOSPHATE 900 MG: 900 INJECTION INTRAVENOUS at 11:56:00

## 2023-05-11 NOTE — INTERVAL H&P NOTE
Pre-op Diagnosis: Calculus of gallbladder with acute cholecystitis without obstruction [K80.00]    The above referenced H&P was reviewed by Nurys Hopper MD on 5/11/2023, the patient was examined and no significant changes have occurred in the patient's condition since the H&P was performed. I discussed with the patient and/or legal representative the potential benefits, risks and side effects of this procedure; the likelihood of the patient achieving goals; and potential problems that might occur during recuperation. I discussed reasonable alternatives to the procedure, including risks, benefits and side effects related to the alternatives and risks related to not receiving this procedure. We will proceed with procedure as planned.

## 2023-05-11 NOTE — ANESTHESIA PROCEDURE NOTES
Airway  Date/Time: 5/11/2023 12:03 PM  Urgency: Elective    Airway not difficult    General Information and Staff    Patient location during procedure: OR  Anesthesiologist: Alvino Campos MD  Resident/CRNA: Camila Christensen CRNA  Performed: CRNA   Performed by: Camila Christensen CRNA  Authorized by: Alvino Campos MD      Indications and Patient Condition  Indications for airway management: anesthesia  Sedation level: deep  Preoxygenated: yes  Patient position: sniffing  Mask difficulty assessment: 1 - vent by mask    Final Airway Details  Final airway type: endotracheal airway      Successful airway: ETT  Cuffed: yes   Successful intubation technique: direct laryngoscopy  Facilitating devices/methods: anterior pressure/BURP  Endotracheal tube insertion site: oral  Blade: Deepak  Blade size: #3    Cormack-Lehane Classification: grade I - full view of glottis  Placement verified by: chest auscultation and capnometry   Cuff volume (mL): 7  Measured from: teeth  ETT to teeth (cm): 21  Number of attempts at approach: 1    Additional Comments  Atraumatic.  Gauze bite block

## 2023-05-11 NOTE — OPERATIVE REPORT
[facility]     Operative Report    Patient Name:  Dante Soto  MR:  [id number]  :  1990  DOS:  23    Preop Dx:  Calculus of gallbladder with chronic cholecystitis without obstruction  Postop Dx:  Calculus of gallbladder with chronic cholecystitis without obstruction  Procedure:  Laparoscopic cholecystectomy  Surgeon:  Med Ly MD  Surgical Assistant.: Emmie Burton CSA  EBL: Blood Output: 5 mL (2023 08:21 PM)    Complication:  None    INDICATION:  Pt is a 28year old female who with Calculus of gallbladder with chronic cholecystitis without obstruction who is scheduled for a Laparoscopic cholecystectomy, possible open cholecystectomy, possible intraoperative cholangiogram.    CONSENT:  An informed consent discussion was held with the patient regarding the nature of Calculus of gallbladder with chronic cholecystitis without obstruction, the treatment options and the details of the procedure. The risks including but not limited to bleeding, wound infection, intra-abdominal infection, injury to the liver, colon, small intestine, pancreas, stomach, common bile duct, incomplete resection, cystic duct stump leak, retained stone and incisional hernia were discussed. The patient expressed understanding and want to proceed with the planned procedure. TECHNIQUE:  The patient was taken to the OR and placed in supine position. General anesthesia was established and the abdomen was prepped in standard fashion. Pneumoperitoneum was obtained using open technique through a supra-umbilical incision. A 12-mm trocar was inserted under direct visualization and no injury occurred. Examination of the abdomen showed a thickened and fibrotic appearing gallbladder consistent with Calculus of gallbladder with chronic cholecystitis without obstruction. Three 5-mm trocars were placed in the epigastric and right abdomen. The patient was placed in reverse Trendelenburg position.   The fundus was grasped and retracted cephalad. The infundibulum was grasped and retracted inferior, anterior, and to the right. The peritoneum along the medial and lateral aspect of the gallbladder/liver edge were incised using hook cautery. The lower 1/3 of the gallbladder was dissected from the liver. Two structures, identified as the cystic duct and artery, are seen entering the infundibulum, thus obtaining the so called \"critical view of safety\". The cystic duct and artery were clipped and divided. The gallbladder was detached from the liver using hook cautery and delivered from the abdomen using an endocatch bag. The abdominal cavity was irrigated with saline and found to be hemostatic. The trocars were removed under direct visualization and no port site bleeding was seen. The supra-umbilical fascia was closed using 0 vicryl. The skin incisions were closed using 4-0 vicryl. Sterile dressings were applied. All instrument and sponge counts were correct. I was present during the critical portions of the procedure.     Aj López MD

## 2023-05-23 ENCOUNTER — OFFICE VISIT (OUTPATIENT)
Dept: SURGERY | Facility: CLINIC | Age: 33
End: 2023-05-23

## 2023-05-23 ENCOUNTER — TELEPHONE (OUTPATIENT)
Dept: SURGERY | Facility: CLINIC | Age: 33
End: 2023-05-23

## 2023-05-23 VITALS — HEIGHT: 66 IN | WEIGHT: 151 LBS | BODY MASS INDEX: 24.27 KG/M2

## 2023-05-23 DIAGNOSIS — Z09 POSTOPERATIVE EXAMINATION: Primary | ICD-10-CM

## 2023-05-23 PROCEDURE — 3008F BODY MASS INDEX DOCD: CPT | Performed by: SURGERY

## 2023-05-23 PROCEDURE — 99024 POSTOP FOLLOW-UP VISIT: CPT | Performed by: SURGERY

## 2023-05-23 RX ORDER — ONDANSETRON 4 MG/1
4 TABLET, FILM COATED ORAL EVERY 8 HOURS PRN
Qty: 15 TABLET | Refills: 0 | Status: SHIPPED | OUTPATIENT
Start: 2023-05-23

## 2023-05-24 ENCOUNTER — HOSPITAL ENCOUNTER (OUTPATIENT)
Age: 33
Discharge: STILL ADMITTED OR ADMINISTRATIVE | End: 2023-05-24
Payer: COMMERCIAL

## 2023-05-24 ENCOUNTER — LAB ENCOUNTER (OUTPATIENT)
Dept: LAB | Age: 33
End: 2023-05-24
Attending: FAMILY MEDICINE
Payer: COMMERCIAL

## 2023-05-24 DIAGNOSIS — N30.00 ACUTE CYSTITIS WITHOUT HEMATURIA: ICD-10-CM

## 2023-05-24 PROBLEM — Z09 POSTOPERATIVE EXAMINATION: Status: ACTIVE | Noted: 2023-05-24

## 2023-05-24 LAB
BILIRUB UR QL STRIP.AUTO: NEGATIVE
COLOR UR AUTO: YELLOW
GLUCOSE UR STRIP.AUTO-MCNC: NEGATIVE MG/DL
KETONES UR STRIP.AUTO-MCNC: NEGATIVE MG/DL
NITRITE UR QL STRIP.AUTO: NEGATIVE
PH UR STRIP.AUTO: 5 [PH] (ref 5–8)
PROT UR STRIP.AUTO-MCNC: NEGATIVE MG/DL
RBC UR QL AUTO: NEGATIVE
SP GR UR STRIP.AUTO: 1.02 (ref 1–1.03)
UROBILINOGEN UR STRIP.AUTO-MCNC: <2 MG/DL

## 2023-05-24 PROCEDURE — 87086 URINE CULTURE/COLONY COUNT: CPT

## 2023-05-24 PROCEDURE — 81001 URINALYSIS AUTO W/SCOPE: CPT

## 2023-05-25 ENCOUNTER — OFFICE VISIT (OUTPATIENT)
Dept: FAMILY MEDICINE CLINIC | Facility: CLINIC | Age: 33
End: 2023-05-25
Payer: COMMERCIAL

## 2023-05-25 VITALS
BODY MASS INDEX: 24.11 KG/M2 | TEMPERATURE: 97 F | HEIGHT: 66 IN | SYSTOLIC BLOOD PRESSURE: 110 MMHG | OXYGEN SATURATION: 99 % | WEIGHT: 150 LBS | RESPIRATION RATE: 16 BRPM | DIASTOLIC BLOOD PRESSURE: 70 MMHG | HEART RATE: 82 BPM

## 2023-05-25 DIAGNOSIS — R10.2 PELVIC PAIN: Primary | ICD-10-CM

## 2023-05-25 DIAGNOSIS — R79.89 HIGH SERUM THYROID STIMULATING HORMONE (TSH): ICD-10-CM

## 2023-05-25 PROCEDURE — 99213 OFFICE O/P EST LOW 20 MIN: CPT | Performed by: FAMILY MEDICINE

## 2023-05-25 PROCEDURE — 3008F BODY MASS INDEX DOCD: CPT | Performed by: FAMILY MEDICINE

## 2023-05-25 PROCEDURE — 3078F DIAST BP <80 MM HG: CPT | Performed by: FAMILY MEDICINE

## 2023-05-25 PROCEDURE — 3074F SYST BP LT 130 MM HG: CPT | Performed by: FAMILY MEDICINE

## 2023-06-13 ENCOUNTER — HOSPITAL ENCOUNTER (OUTPATIENT)
Dept: MAMMOGRAPHY | Facility: HOSPITAL | Age: 33
Discharge: HOME | End: 2023-06-13
Attending: FAMILY MEDICINE
Payer: COMMERCIAL

## 2023-06-13 DIAGNOSIS — N64.4 BREAST PAIN: ICD-10-CM

## 2023-06-13 PROCEDURE — 77066 DX MAMMO INCL CAD BI: CPT | Performed by: FAMILY MEDICINE

## 2023-06-13 PROCEDURE — 76642 ULTRASOUND BREAST LIMITED: CPT | Performed by: FAMILY MEDICINE

## 2023-06-13 PROCEDURE — 77062 BREAST TOMOSYNTHESIS BI: CPT | Performed by: FAMILY MEDICINE

## 2023-06-14 ENCOUNTER — HOSPITAL ENCOUNTER (OUTPATIENT)
Dept: ULTRASOUND IMAGING | Age: 33
Discharge: HOME | End: 2023-06-14
Attending: FAMILY MEDICINE
Payer: COMMERCIAL

## 2023-06-14 DIAGNOSIS — R10.2 PELVIC PAIN: ICD-10-CM

## 2023-06-14 PROCEDURE — 76856 US EXAM PELVIC COMPLETE: CPT | Performed by: FAMILY MEDICINE

## 2023-06-14 PROCEDURE — 76830 TRANSVAGINAL US NON-OB: CPT | Performed by: FAMILY MEDICINE

## 2023-06-14 PROCEDURE — 93975 VASCULAR STUDY: CPT | Performed by: FAMILY MEDICINE

## 2023-06-16 ENCOUNTER — TELEPHONE (OUTPATIENT)
Dept: FAMILY MEDICINE CLINIC | Facility: CLINIC | Age: 33
End: 2023-06-16

## 2023-06-16 NOTE — TELEPHONE ENCOUNTER
Patient states her US & CT were normal, still having R lower abd pain, does she want to order more testing? Last seen 5/25/23.

## 2023-06-19 NOTE — TELEPHONE ENCOUNTER
Yeah, please have her schedule. I am not in the office nxt week so she can also be scheduled with another provider in  The office.

## 2023-07-20 RX ORDER — ONDANSETRON 4 MG/1
4 TABLET, FILM COATED ORAL EVERY 8 HOURS PRN
Qty: 15 TABLET | Refills: 0 | OUTPATIENT
Start: 2023-07-20

## 2023-08-14 NOTE — TELEPHONE ENCOUNTER
Last OV: 07/12/22 - Annual  Last refill date: 09/15/2022  Follow-up: 07/2023  Next appt.: None      Patient is due for annual. Please contact her to schedule appt and then return to RN pool for refill.  Thank you

## 2023-08-18 RX ORDER — ETONOGESTREL AND ETHINYL ESTRADIOL 11.7; 2.7 MG/1; MG/1
INSERT, EXTENDED RELEASE VAGINAL
Refills: 3 | OUTPATIENT
Start: 2023-08-18

## 2023-08-18 NOTE — TELEPHONE ENCOUNTER
Pt seeing new ob gyne office    Future Appointments   Date Time Provider Travis Jimenez   9/21/2023  2:00 PM Hilda ALTMAN [facility]  [facility]

## 2023-09-05 ENCOUNTER — PATIENT MESSAGE (OUTPATIENT)
Dept: FAMILY MEDICINE CLINIC | Facility: CLINIC | Age: 33
End: 2023-09-05

## 2023-09-05 NOTE — TELEPHONE ENCOUNTER
From: Nubia Joyce  To: Titus Madsen DO  Sent: 9/5/2023 9:57 AM CDT  Subject: Medication    Hi Dr. Guanako Dumont,   I am in the process of switching my OB/Gyn, and I have an appointment later this month. My last OB/Gyn won't refill my Nuvaring and I was wondering if you would be willing to prescribe just one ring to hold me over until my appt with my new DrWashington? Thank you!

## 2023-09-11 RX ORDER — ETONOGESTREL AND ETHINYL ESTRADIOL 11.7; 2.7 MG/1; MG/1
INSERT, EXTENDED RELEASE VAGINAL
Refills: 3 | OUTPATIENT
Start: 2023-09-11

## 2023-09-21 ENCOUNTER — OFFICE VISIT (OUTPATIENT)
Dept: OBGYN CLINIC | Facility: CLINIC | Age: 33
End: 2023-09-21
Payer: COMMERCIAL

## 2023-09-21 VITALS
BODY MASS INDEX: 26.52 KG/M2 | SYSTOLIC BLOOD PRESSURE: 110 MMHG | WEIGHT: 165 LBS | HEIGHT: 66 IN | DIASTOLIC BLOOD PRESSURE: 58 MMHG

## 2023-09-21 DIAGNOSIS — N89.8 VAGINAL DISCHARGE: ICD-10-CM

## 2023-09-21 DIAGNOSIS — Z12.4 SCREENING FOR CERVICAL CANCER: ICD-10-CM

## 2023-09-21 DIAGNOSIS — Z01.419 VISIT FOR GYNECOLOGIC EXAMINATION: Primary | ICD-10-CM

## 2023-09-21 DIAGNOSIS — Z12.4 PAP SMEAR FOR CERVICAL CANCER SCREENING: ICD-10-CM

## 2023-09-21 PROCEDURE — 87624 HPV HI-RISK TYP POOLED RSLT: CPT | Performed by: OBSTETRICS & GYNECOLOGY

## 2023-09-21 PROCEDURE — 99395 PREV VISIT EST AGE 18-39: CPT | Performed by: OBSTETRICS & GYNECOLOGY

## 2023-09-21 PROCEDURE — 3008F BODY MASS INDEX DOCD: CPT | Performed by: OBSTETRICS & GYNECOLOGY

## 2023-09-21 PROCEDURE — 3078F DIAST BP <80 MM HG: CPT | Performed by: OBSTETRICS & GYNECOLOGY

## 2023-09-21 PROCEDURE — 3074F SYST BP LT 130 MM HG: CPT | Performed by: OBSTETRICS & GYNECOLOGY

## 2023-09-21 RX ORDER — ETONOGESTREL AND ETHINYL ESTRADIOL 11.7; 2.7 MG/1; MG/1
INSERT, EXTENDED RELEASE VAGINAL
Qty: 3 RING | Refills: 3 | Status: SHIPPED | OUTPATIENT
Start: 2023-09-21

## 2023-09-21 NOTE — PROGRESS NOTES
ANNUAL GYN EXAM  [facility]    CHIEF COMPLAINT:  Patient presents with:  Contraception: nuvaring  Annual     HISTORY OF PRESENT ILLNESS:   Melissa Wu is a 35year old female [place]  who presents for annual well woman visit. She is feeling well. Annual exam / refill of nuvaring    Chronic excessive vaginal discharge for years now  Was tested last year, told it was physiologic and was told to try boric acid. Did not bc she was worried about side effects. Tried being off the nuvaring for a year, tried using diva-cup, probiotics. Likes using nuvaring to control period symptoms (heavy bleeding, PMS symptoms,)    Patient's last menstrual period was 2023 (exact date). Cycles the nuvaring, stress is a factor and gets spotting on the nuvaring. Mom had fibroids had hysterectomy early 45s because of the bleeding.     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  Mood: now its alright, undiagnosed anxiety/depression      PAST MEDICAL HISTORY:   Past Medical History:   Diagnosis Date    Anesthesia complication     Esophageal reflux     High serum thyroid stimulating hormone (TSH) 5/10/2023    Intermittent orthostatic proteinuria 2022    PONV (postoperative nausea and vomiting)         PAST SURGICAL HISTORY:   Past Surgical History:   Procedure Laterality Date    Yury localization wire 1 site right (cpt=19281)  2017    fibroadenoma    Other surgical history      right breast biopsy 2017 or 2018    Other surgical history      Bunionectomy 17yrs ago    Other surgical history      colonoscopy    Other surgical history Right     eye Lasik    Removal gallbladder          PAST OB HISTORY:  OB History    Para Term  AB Living   0 0 0 0 0 0   SAB IAB Ectopic Multiple Live Births   0 0 0 0 0       CURRENT MEDICATIONS:      Current Outpatient Medications:     Etonogestrel-Ethinyl Estradiol (NUVARING) 0.12-0.015 MG/24HR Vaginal Ring, Place 1 ring vaginally for 3 weeks then remove ring for 1 week., Disp: 3 Ring, Rfl: 3    levocetirizine 5 MG Oral Tab, , Disp: , Rfl:     Multiple Vitamin (MULTIVITAMINS OR), Take by mouth., Disp: , Rfl:     ALLERGIES:    Penicillins             HIVES, RASH  Keflex [Cephalexin]     RASH    SOCIAL HISTORY:  Social History    Socioeconomic History      Marital status: Single    Tobacco Use      Smoking status: Never      Smokeless tobacco: Never    Vaping Use      Vaping Use: Never used    Substance and Sexual Activity      Alcohol use: Yes        Comment: 3-4 times per week 1 drink      Drug use: Never      Sexual activity: Not Currently        Comment: none    Other Topics      Concerns:        Caffeine Concern: Yes        Exercise: Yes        Seat Belt: Yes        Blood Transfusions: No      FAMILY HISTORY:  Family History   Problem Relation Age of Onset    Diabetes Mother     Diabetes Father     Breast Cancer Paternal Cousin Female         post-menopause     ASSESSMENTS:  REVIEW OF SYSTEMS:  CONSTITUTIONAL:  negative for fevers, chills and sweats    EYES:  negative for  blurred vision and visual disturbance  RESPIRATORY:  negative for  cough and shortness of breath  CARDIOVASCULAR:  negative for  chest pain, palpitations  GASTROINTESTINAL:  No constipation/diarrhea, no pain  GENITOURINARY:  See History of Present Illness  INTEGUMENT/BREAST: Breast: no masses, no nipple discharge  ENDOCRINE:  negative for acne, constipation, diarrhea, cold intolerance, heat intolerance, fatigue, hair loss, weight gain and weight loss  MUSCULOSKELETAL:  negative for joint pain  NEUROLOGICAL:  negative for dizziness/lightheadedness and headaches  BEHAVIOR/PSYCH:  Negative for depressed mood, anhedonia and anxiety    PHYSICAL EXAM  Patient's last menstrual period was 09/17/2023 (exact date). 09/21/23  1415   BP: 110/58   Weight: 165 lb (74.8 kg)   Height: 66\"       CONSTITUTIONAL: Awake, alert, cooperative, no apparent distress, and appears stated age   NECK: Supple, symmetrical, trachea midline, no adenopathy, thyroid symmetric, not enlarged and no tenderness  LUNGS: no excess work of breathing  ABDOMEN: Soft, non-distended, non-tender, no masses palpated    CHEST/BREASTS: Breasts symmetrical, skin without lesion(s), no nipple retraction or dimpling, no nipple discharge, no masses palpated, no axillary or supraclavicular adenopathy  GENITAL/URINARY:    External Genitalia:  General appearance; normal, Hair distribution; normal, Lesions absent   Urethral Meatus:  Lesions absent, Prolapse absent  Bladder:  Tenderness absent, Cystocele absent  Vagina:  Discharge present, milky no odor, Lesions absent, Pelvic support normal  Cervix:  Lesions absent, Discharge absent, Tenderness absent  Uterus:  Size normal, Masses absent, Tenderness absent  Adnexa: Masses absent, Tenderness absent  Anus/Perineum:  Lesions absent    MUSCULOSKELETAL: There is no redness, warmth, or swelling of the joints. Tone is normal.  NEUROLOGIC: Patient is awake, alert and oriented to name, place and time. Casual gait is normal.  SKIN: no bruising or bleeding and no rashes  PSYCHIATRIC: Behavior:  Appropriate  Mood:  appropriate  ASSESSMENT AND PLAN:  1. Visit for gynecologic examination  - CBE and pelvic exam with pap /hpv today. Self breast awareness discussed. - ThinPrep PAP Smear; Future  - Hpv Dna  High Risk , Thin Prep Collect; Future  - ThinPrep PAP Smear  - Hpv Dna  High Risk , Thin Prep Collect    2. Pap smear for cervical cancer screening  - ThinPrep PAP Smear; Future  - Hpv Dna  High Risk , Thin Prep Collect; Future  - ThinPrep PAP Smear  - Hpv Dna  High Risk , Thin Prep Collect    3. Vaginal discharge  - Vikor swab collected. Recommend baking soda sitz baths while awaiting swab results.   - VIKOR VAGINAL ID; Future follow up 1 yr or as needed  Geddeschristopher Bahena DO

## 2023-09-22 LAB — HPV I/H RISK 1 DNA SPEC QL NAA+PROBE: NEGATIVE

## 2023-09-26 ENCOUNTER — TELEPHONE (OUTPATIENT)
Dept: OBGYN CLINIC | Facility: CLINIC | Age: 33
End: 2023-09-26

## 2023-09-26 RX ORDER — AZITHROMYCIN 500 MG/1
1000 TABLET, FILM COATED ORAL ONCE
Qty: 2 TABLET | Refills: 0 | Status: SHIPPED | OUTPATIENT
Start: 2023-09-26 | End: 2023-09-26

## 2023-09-26 NOTE — TELEPHONE ENCOUNTER
Received message from [facility], Pt would like medication sent to [place] and done.

## 2023-09-26 NOTE — TELEPHONE ENCOUNTER
Narrative    VIKOR- +Lactobacillus iners- treatment Azithromycin 1 gram x 1 dose     Order placed    Called pt informed of above with treatment and agrees.

## 2023-11-13 ENCOUNTER — LAB ENCOUNTER (OUTPATIENT)
Dept: LAB | Age: 33
End: 2023-11-13
Attending: FAMILY MEDICINE
Payer: COMMERCIAL

## 2023-11-13 DIAGNOSIS — R79.89 HIGH SERUM THYROID STIMULATING HORMONE (TSH): ICD-10-CM

## 2023-11-13 LAB
T4 FREE SERPL-MCNC: 1.1 NG/DL (ref 0.8–1.7)
TSI SER-ACNC: 2.35 MIU/ML (ref 0.36–3.74)

## 2023-11-13 PROCEDURE — 84439 ASSAY OF FREE THYROXINE: CPT | Performed by: FAMILY MEDICINE

## 2023-11-13 PROCEDURE — 84443 ASSAY THYROID STIM HORMONE: CPT | Performed by: FAMILY MEDICINE

## 2024-01-06 ENCOUNTER — OFFICE VISIT (OUTPATIENT)
Dept: FAMILY MEDICINE CLINIC | Facility: CLINIC | Age: 34
End: 2024-01-06
Payer: COMMERCIAL

## 2024-01-06 VITALS
OXYGEN SATURATION: 97 % | TEMPERATURE: 98 F | HEART RATE: 70 BPM | RESPIRATION RATE: 18 BRPM | HEIGHT: 66 IN | WEIGHT: 170 LBS | SYSTOLIC BLOOD PRESSURE: 118 MMHG | DIASTOLIC BLOOD PRESSURE: 81 MMHG | BODY MASS INDEX: 27.32 KG/M2

## 2024-01-06 DIAGNOSIS — J02.9 SORE THROAT: ICD-10-CM

## 2024-01-06 DIAGNOSIS — R68.89 FLU-LIKE SYMPTOMS: Primary | ICD-10-CM

## 2024-01-06 LAB
CONTROL LINE PRESENT WITH A CLEAR BACKGROUND (YES/NO): YES YES/NO
KIT LOT #: NORMAL NUMERIC

## 2024-01-06 PROCEDURE — 99213 OFFICE O/P EST LOW 20 MIN: CPT | Performed by: NURSE PRACTITIONER

## 2024-01-06 PROCEDURE — 87880 STREP A ASSAY W/OPTIC: CPT | Performed by: NURSE PRACTITIONER

## 2024-01-06 PROCEDURE — 3074F SYST BP LT 130 MM HG: CPT | Performed by: NURSE PRACTITIONER

## 2024-01-06 PROCEDURE — 87637 SARSCOV2&INF A&B&RSV AMP PRB: CPT | Performed by: NURSE PRACTITIONER

## 2024-01-06 PROCEDURE — 3079F DIAST BP 80-89 MM HG: CPT | Performed by: NURSE PRACTITIONER

## 2024-01-06 PROCEDURE — 87081 CULTURE SCREEN ONLY: CPT | Performed by: NURSE PRACTITIONER

## 2024-01-06 PROCEDURE — 3008F BODY MASS INDEX DOCD: CPT | Performed by: NURSE PRACTITIONER

## 2024-01-06 NOTE — PROGRESS NOTES
CHIEF COMPLAINT:     Chief Complaint   Patient presents with    Sore Throat     Sore throat, congestion, phlegm, and fatigue. Symptoms started yesterday   OTC: Tylenol and Ibuprofen   NO exposure        HPI:   Gwen Abdullahi is a 33 year old female who presents for upper respiratory symptoms for  3 days. Patient reports sore throat, congestion, fatigue . Symptoms have been unchanged since onset.  Treating symptoms with otc medication.      Current Outpatient Medications   Medication Sig Dispense Refill    Etonogestrel-Ethinyl Estradiol (NUVARING) 0.12-0.015 MG/24HR Vaginal Ring Place 1 ring vaginally for 3 weeks then remove ring for 1 week. 3 Ring 3    Multiple Vitamin (MULTIVITAMINS OR) Take by mouth.      levocetirizine 5 MG Oral Tab         Past Medical History:   Diagnosis Date    Anesthesia complication     Esophageal reflux     High serum thyroid stimulating hormone (TSH) 5/10/2023    Intermittent orthostatic proteinuria 03/17/2022    PONV (postoperative nausea and vomiting)       Past Surgical History:   Procedure Laterality Date    MANISHA LOCALIZATION WIRE 1 SITE RIGHT (CPT=19281)  2017    fibroadenoma    OTHER SURGICAL HISTORY      right breast biopsy 2017 or 2018    OTHER SURGICAL HISTORY      Bunionectomy 17yrs ago    OTHER SURGICAL HISTORY      colonoscopy    OTHER SURGICAL HISTORY Right     eye Lasik    REMOVAL GALLBLADDER  2023         Social History     Socioeconomic History    Marital status: Single   Tobacco Use    Smoking status: Never    Smokeless tobacco: Never   Vaping Use    Vaping Use: Never used   Substance and Sexual Activity    Alcohol use: Yes     Comment: 3-4 times per week 1 drink    Drug use: Never    Sexual activity: Not Currently     Comment: none   Other Topics Concern    Caffeine Concern Yes    Exercise Yes    Seat Belt Yes    Blood Transfusions No         REVIEW OF SYSTEMS:   GENERAL: normal appetite  SKIN: no rashes or abnormal skin lesions  HEENT: See HPI  LUNGS: See  HPI  CARDIOVASCULAR: denies chest pain or palpitations   GI: denies N/V/C or abdominal pain      EXAM:   /81 (BP Location: Left arm, Patient Position: Sitting, Cuff Size: adult)   Pulse 70   Temp 98.4 °F (36.9 °C) (Oral)   Resp 18   Ht 5' 6\" (1.676 m)   Wt 170 lb (77.1 kg)   LMP 12/22/2023 (Approximate)   SpO2 97%   BMI 27.44 kg/m²   GENERAL: well developed, well nourished,in no apparent distress  SKIN: no rashes,no suspicious lesions  HEAD: atraumatic, normocephalic.  no tenderness on palpation of  sinuses  EYES: conjunctiva clear, EOM intact  EARS: TM's pearly, no bulging, no retraction,no fluid, bony landmarks visible  NOSE: Nostrils patent, clear nasal discharge, nasal mucosa pink   THROAT: Oral mucosa pink, moist. Posterior pharynx is  erythematous. no exudates. Tonsils 1/4.    NECK: Supple, non-tender  LUNGS: clear to auscultation bilaterally, no wheezes or rhonchi. Breathing is non labored.  CARDIO: RRR without murmur  EXTREMITIES: no cyanosis, clubbing or edema  LYMPH:  pos anterior cervical lymphadenopathy.        ASSESSMENT AND PLAN:   Gwen Abdullahi is a 33 year old female who presents with upper respiratory symptoms that are consistent with    ASSESSMENT:   Encounter Diagnoses   Name Primary?    Flu-like symptoms Yes    Sore throat        PLAN:  Comfort care as described in Patient Instructions  Educated on viral vs bacterial  Educated on supportive measures: ibuprofen/tylenol, hydration, zyrtec, delsym for cough if needed  Educated on s/s of worsening sx and when to seek higher level of care  Follow up with pcp if not improving      Meds & Refills for this Visit:  Requested Prescriptions      No prescriptions requested or ordered in this encounter     Risks, benefits, and side effects of medication explained and discussed.    The patient indicates understanding of these issues and agrees to the plan.  The patient is asked to f/u with PCP if sx's persist or worsen.  There are no Patient  Instructions on file for this visit.

## 2024-10-01 RX ORDER — ETONOGESTREL AND ETHINYL ESTRADIOL VAGINAL RING .015; .12 MG/D; MG/D
RING VAGINAL
Qty: 3 RING | Refills: 0 | Status: SHIPPED | OUTPATIENT
Start: 2024-10-01

## 2024-11-14 ENCOUNTER — OFFICE VISIT (OUTPATIENT)
Dept: OBGYN CLINIC | Facility: CLINIC | Age: 34
End: 2024-11-14
Payer: COMMERCIAL

## 2024-11-14 VITALS
DIASTOLIC BLOOD PRESSURE: 78 MMHG | WEIGHT: 191 LBS | HEIGHT: 66 IN | BODY MASS INDEX: 30.7 KG/M2 | SYSTOLIC BLOOD PRESSURE: 140 MMHG

## 2024-11-14 DIAGNOSIS — Z01.419 VISIT FOR GYNECOLOGIC EXAMINATION: Primary | ICD-10-CM

## 2024-11-14 DIAGNOSIS — E34.9 HORMONAL DISORDER: ICD-10-CM

## 2024-11-14 DIAGNOSIS — N76.1 DESQUAMATIVE INFLAMMATORY VAGINITIS: ICD-10-CM

## 2024-11-14 PROCEDURE — 99395 PREV VISIT EST AGE 18-39: CPT | Performed by: OBSTETRICS & GYNECOLOGY

## 2024-11-14 RX ORDER — ETONOGESTREL AND ETHINYL ESTRADIOL VAGINAL RING .015; .12 MG/D; MG/D
RING VAGINAL
Qty: 3 RING | Refills: 3 | Status: SHIPPED | OUTPATIENT
Start: 2024-11-14

## 2024-11-14 RX ORDER — CLINDAMYCIN PHOSPHATE 20 MG/G
1 CREAM VAGINAL NIGHTLY
Qty: 40 G | Refills: 3 | Status: SHIPPED | OUTPATIENT
Start: 2024-11-14

## 2024-11-14 NOTE — PROGRESS NOTES
ANNUAL GYN EXAM  [facility]    CHIEF COMPLAINT:    Chief Complaint   Patient presents with    Annual      HISTORY OF PRESENT ILLNESS:   Gwen Abdullahi is a 34 year old female   who presents for annual well woman visit.  She is feeling well.    Annual exam      Has been on nuvaring for so long.  Fibroids - mother and grandmother had fibroids and needed hysterectomies rather young    Patient's last menstrual period was 10/17/2024 (approximate).  Does see some spotting before period on and off.    Thinking of possibility of freezing eggs in the near future.    Had the vikor test - meds didn't work, baking soda didn't work, boric acid didn't work.    Had some hot flashes 3 times out of nowhwere.  It was cold - had to go outside to cool down. Didn't feel anxious - just hot.    No sex.    Exercise: 2-3 times per week  Diet: getting better, cooking more at home now  Mood: good      PAST MEDICAL HISTORY:   Past Medical History:    Anesthesia complication    Esophageal reflux    High serum thyroid stimulating hormone (TSH)    Intermittent orthostatic proteinuria    PONV (postoperative nausea and vomiting)        PAST SURGICAL HISTORY:   Past Surgical History:   Procedure Laterality Date    Yury localization wire 1 site right (cpt=19281)  2017    fibroadenoma    Other surgical history      right breast biopsy 2017 or 2018    Other surgical history      Bunionectomy 17yrs ago    Other surgical history      colonoscopy    Other surgical history Right     eye Lasik    Removal gallbladder          PAST OB HISTORY:  OB History    Para Term  AB Living   0 0 0 0 0 0   SAB IAB Ectopic Multiple Live Births   0 0 0 0 0       CURRENT MEDICATIONS:      Current Outpatient Medications:     Cetirizine HCl 10 MG Oral Cap, , Disp: , Rfl:     Etonogestrel-Ethinyl Estradiol 0.12-0.015 MG/24HR Vaginal Ring, INSERT 1 RING VAGINALLY FOR 3 WEEKS THEN REMOVE FOR 1 WEEK, Disp: 3 Ring, Rfl: 3    clindamycin 2 % Vaginal  Cream, Place 1 applicator vaginally nightly. Use for 6 weeks, Disp: 40 g, Rfl: 3    Multiple Vitamin (MULTIVITAMINS OR), Take by mouth., Disp: , Rfl:     ALLERGIES:  Allergies[1]    SOCIAL HISTORY:  Social History     Socioeconomic History    Marital status: Single   Tobacco Use    Smoking status: Never    Smokeless tobacco: Never   Vaping Use    Vaping status: Never Used   Substance and Sexual Activity    Alcohol use: Yes     Comment: 3-4 times per week 1 drink    Drug use: Never    Sexual activity: Not Currently     Comment: none   Other Topics Concern    Caffeine Concern Yes    Exercise Yes    Seat Belt Yes    Blood Transfusions No       FAMILY HISTORY:  Family History   Problem Relation Age of Onset    Diabetes Father     Other (fibroids) Mother         hysterectomy @ 45 yo    Diabetes Mother     Other (fibroids) Maternal Grandmother     Breast Cancer Paternal Cousin Female         post-menopause     ASSESSMENTS:  REVIEW OF SYSTEMS:  CONSTITUTIONAL:  negative for fevers, chills and sweats    EYES:  negative for  blurred vision and visual disturbance  RESPIRATORY:  negative for  cough and shortness of breath  CARDIOVASCULAR:  negative for  chest pain, palpitations  GASTROINTESTINAL:  No constipation/diarrhea, no pain  GENITOURINARY:  See History of Present Illness  INTEGUMENT/BREAST: Breast: no masses, no nipple discharge  ENDOCRINE:  negative for acne, constipation, diarrhea, cold intolerance, heat intolerance, fatigue, hair loss, weight gain and weight loss  MUSCULOSKELETAL:  negative for joint pain  NEUROLOGICAL:  negative for dizziness/lightheadedness and headaches  BEHAVIOR/PSYCH:  Negative for depressed mood, anhedonia and anxiety    PHYSICAL EXAM  Patient's last menstrual period was 10/17/2024 (approximate).   Vitals:    11/14/24 1630   BP: 140/78   Weight: 191 lb (86.6 kg)   Height: 66\"       CONSTITUTIONAL: Awake, alert, cooperative, no apparent distress, and appears stated age   NECK: Supple,  symmetrical, trachea midline, no adenopathy, thyroid symmetric, not enlarged and no tenderness  LUNGS: no excess work of breathing  ABDOMEN: Soft, non-distended, non-tender, no masses palpated    CHEST/BREASTS: Breasts symmetrical, skin without lesion(s), no nipple retraction or dimpling, no nipple discharge, no masses palpated, no axillary or supraclavicular adenopathy  GENITAL/URINARY:    External Genitalia:  General appearance; normal, Hair distribution; normal, Lesions absent   Urethral Meatus:  Lesions absent, Prolapse absent  Bladder:  Tenderness absent, Cystocele absent  Vagina:  Discharge creamy white, lacy reticulations on mucosa, Pelvic support normal  Cervix:  Lesions absent, Discharge absent, Tenderness absent  Uterus:  Size normal, Masses absent, Tenderness absent  Adnexa:  Masses absent, Tenderness absent  Anus/Perineum:  Lesions absent    MUSCULOSKELETAL: There is no redness, warmth, or swelling of the joints.  Tone is normal.  NEUROLOGIC: Patient is awake, alert and oriented to name, place and time. Casual gait is normal.  SKIN: no bruising or bleeding and no rashes  PSYCHIATRIC: Behavior:  Appropriate  Mood:  appropriate  ASSESSMENT AND PLAN:  1. Visit for gynecologic examination  - CBE and pelvic exam today. Self breast awareness discussed.    2. Hormonal disorder  - referral to integrative medicine for further exploration of this, including Turkmen panel.  - [facility] ([facility]) - INTERNAL REF    3. Desquamative inflammatory vaginitis  - explained findings, plan for 6 wks vaginal clinday  - vikor swab collected today.  - clindamycin 2 % Vaginal Cream; Place 1 applicator vaginally nightly. Use for 6 weeks  Dispense: 40 g; Refill: 3       follow up 1 yr or as needed  Millie Mcintyre DO         [1]   Allergies  Allergen Reactions    Penicillins HIVES and RASH    Keflex [Cephalexin] RASH

## 2024-11-18 ENCOUNTER — OFFICE VISIT (OUTPATIENT)
Dept: FAMILY MEDICINE CLINIC | Facility: CLINIC | Age: 34
End: 2024-11-18
Payer: COMMERCIAL

## 2024-11-18 VITALS
DIASTOLIC BLOOD PRESSURE: 70 MMHG | RESPIRATION RATE: 16 BRPM | TEMPERATURE: 97 F | WEIGHT: 195 LBS | SYSTOLIC BLOOD PRESSURE: 122 MMHG | OXYGEN SATURATION: 97 % | BODY MASS INDEX: 31.34 KG/M2 | HEIGHT: 66 IN | HEART RATE: 96 BPM

## 2024-11-18 DIAGNOSIS — K52.9 CHRONIC DIARRHEA: ICD-10-CM

## 2024-11-18 DIAGNOSIS — Z13.1 DIABETES MELLITUS SCREENING: ICD-10-CM

## 2024-11-18 DIAGNOSIS — R10.816 EPIGASTRIC ABDOMINAL TENDERNESS WITHOUT REBOUND TENDERNESS: ICD-10-CM

## 2024-11-18 DIAGNOSIS — Z00.00 WELL ADULT EXAM: Primary | ICD-10-CM

## 2024-11-18 DIAGNOSIS — R11.0 CHRONIC NAUSEA: ICD-10-CM

## 2024-11-18 DIAGNOSIS — Z13.220 SCREENING CHOLESTEROL LEVEL: ICD-10-CM

## 2024-11-18 DIAGNOSIS — Z13.31 NEGATIVE DEPRESSION SCREENING: ICD-10-CM

## 2024-11-18 DIAGNOSIS — R14.0 ABDOMINAL BLOATING: ICD-10-CM

## 2024-11-18 DIAGNOSIS — R79.89 ELEVATED TSH: ICD-10-CM

## 2024-11-18 DIAGNOSIS — Z13.21 ENCOUNTER FOR VITAMIN DEFICIENCY SCREENING: ICD-10-CM

## 2024-11-18 DIAGNOSIS — R80.2: ICD-10-CM

## 2024-11-18 RX ORDER — ONDANSETRON 4 MG/1
4 TABLET, FILM COATED ORAL EVERY 8 HOURS PRN
Qty: 10 TABLET | Refills: 0 | Status: SHIPPED | OUTPATIENT
Start: 2024-11-18

## 2024-11-18 NOTE — PROGRESS NOTES
Subjective:      Chief Complaint   Patient presents with    Physical     HISTORY OF PRESENT ILLNESS  HPI  HPI obtained per patient report.  Gwen Abdullahi is a pleasant 34 year old female presenting for an annual physical.   She reports chronic intermittent GI symptoms over the past 1 year. Her symptoms have been predominantly diarrhea and abdominal bloating. She had 1 episode of mild epigastric pain recently. She has been experiencing intermittent nausea as well. She denies blood/mucus in stools.    PAST PATIENT HISTORY  Past Medical History:    Anesthesia complication    Esophageal reflux    High serum thyroid stimulating hormone (TSH)    Intermittent orthostatic proteinuria    PONV (postoperative nausea and vomiting)     Past Surgical History:   Procedure Laterality Date    Yury localization wire 1 site right (cpt=19281)  2017    fibroadenoma    Other surgical history      right breast biopsy 2017 or 2018    Other surgical history      Bunionectomy 17yrs ago    Other surgical history      colonoscopy    Other surgical history Right     eye Lasik    Removal gallbladder  2023       CURRENT MEDICATIONS  Medications Taking[1]    HEALTH MAINTENANCE  Immunization History   Administered Date(s) Administered    Covid-19 Vaccine Moderna 100 mcg/0.5 ml 01/21/2021, 02/18/2021    Covid-19 Vaccine Moderna 50 Mcg/0.25 Ml 12/09/2021    HEP B, Ped/Adol 05/03/2000, 06/07/2000, 11/01/2000    Influenza 12/19/2016, 09/01/2019, 09/01/2021, 11/26/2022, 11/24/2023, 10/04/2024    MMR 10/05/2013, 11/05/2013    Varicella 10/05/2013, 11/05/2013       ALLERGIES AND DRUG REACTIONS  Allergies[2]    Family History   Problem Relation Age of Onset    Diabetes Father     Other (fibroids) Mother         hysterectomy @ 43 yo    Diabetes Mother     Other (fibroids) Maternal Grandmother     Breast Cancer Paternal Cousin Female         post-menopause     Social History     Socioeconomic History    Marital status: Single   Tobacco Use    Smoking status:  Never    Smokeless tobacco: Never   Vaping Use    Vaping status: Never Used   Substance and Sexual Activity    Alcohol use: Yes     Comment: 3-4 times per week 1 drink    Drug use: Never    Sexual activity: Not Currently     Comment: none   Other Topics Concern    Caffeine Concern Yes    Exercise Yes    Seat Belt Yes    Blood Transfusions No     Social Drivers of Health      Received from [facility], [facility]    Social Connections    Received from [facility], [facility]    Housing Stability       Review of Systems   Gastrointestinal:  Positive for abdominal distention, abdominal pain, diarrhea and nausea.   All other systems reviewed and are negative.         Objective:      /70   Pulse 96   Temp 97 °F (36.1 °C) (Temporal)   Resp 16   Ht 5' 6\" (1.676 m)   Wt 195 lb (88.5 kg)   LMP 10/17/2024 (Approximate)   SpO2 97%   BMI 31.47 kg/m²   Body mass index is 31.47 kg/m².    Physical Exam  Vitals reviewed.   Constitutional:       General: She is not in acute distress.     Appearance: She is not ill-appearing, toxic-appearing or diaphoretic.   HENT:      Head: Normocephalic and atraumatic.   Eyes:      General: No scleral icterus.        Right eye: No discharge.         Left eye: No discharge.      Extraocular Movements: Extraocular movements intact.      Conjunctiva/sclera: Conjunctivae normal.   Cardiovascular:      Rate and Rhythm: Normal rate and regular rhythm.      Heart sounds: Normal heart sounds.   Pulmonary:      Effort: Pulmonary effort is normal.      Breath sounds: Normal breath sounds.   Abdominal:      General: There is no distension.      Palpations: Abdomen is soft. There is no mass.      Tenderness: There is abdominal tenderness (mild epigastric tenderness). There is no guarding or rebound.      Hernia: No hernia is present.      Comments: Diminished BS throughout   Musculoskeletal:      Cervical back: Neck  supple. No tenderness.      Right lower leg: No edema.      Left lower leg: No edema.   Lymphadenopathy:      Cervical: No cervical adenopathy.   Skin:     General: Skin is warm and dry.      Coloration: Skin is not jaundiced or pale.      Findings: No bruising, erythema or rash.   Neurological:      Mental Status: She is alert and oriented to person, place, and time.   Psychiatric:         Mood and Affect: Mood normal.            Assessment and Plan:      1. Well adult exam (Primary)  -     CBC With Differential With Platelet; Future; Expected date: 11/18/2024  -     Comp Metabolic Panel (14); Future; Expected date: 11/18/2024  -     TSH W Reflex To Free T4; Future; Expected date: 11/18/2024  -     Lipid Panel; Future; Expected date: 11/18/2024  -     Hemoglobin A1C; Future; Expected date: 11/18/2024  -     Urinalysis, Routine; Future; Expected date: 11/18/2024  2. Screening cholesterol level  -     Lipid Panel; Future; Expected date: 11/18/2024  3. Diabetes mellitus screening  -     Hemoglobin A1C; Future; Expected date: 11/18/2024  4. Encounter for vitamin deficiency screening  -     Vitamin D; Future; Expected date: 11/18/2024  5. Negative depression screening  6. Elevated TSH  -     TSH W Reflex To Free T4; Future; Expected date: 11/18/2024  7. Intermittent orthostatic proteinuria  -     Urinalysis, Routine; Future; Expected date: 11/18/2024  8. Chronic nausea  -     Ondansetron HCl; Take 1 tablet (4 mg total) by mouth every 8 (eight) hours as needed for Nausea.  Dispense: 10 tablet; Refill: 0  -     Adult Food Allergy Prof; Future; Expected date: 11/18/2024  9. Chronic diarrhea  -     Thyroid Stimulating Immunoglobulin; Future; Expected date: 11/18/2024  -     Tissue Transglutaminase Ab, IgA; Future; Expected date: 11/18/2024  -     H. Pylori Stool Ag, EIA; Future; Expected date: 11/18/2024  -     Fecal Fat, Qualitative; Future; Expected date: 11/18/2024  -     C. diff toxigenic PCR (OPT); Future; Expected  date: 11/18/2024  -     Adult Food Allergy Prof; Future; Expected date: 11/18/2024  10. Abdominal bloating  -     Thyroid Stimulating Immunoglobulin; Future; Expected date: 11/18/2024  -     Tissue Transglutaminase Ab, IgA; Future; Expected date: 11/18/2024  -     H. Pylori Stool Ag, EIA; Future; Expected date: 11/18/2024  -     Fecal Fat, Qualitative; Future; Expected date: 11/18/2024  -     C. diff toxigenic PCR (OPT); Future; Expected date: 11/18/2024  -     Adult Food Allergy Prof; Future; Expected date: 11/18/2024  11. Epigastric abdominal tenderness without rebound tenderness  -     Adult Food Allergy Prof; Future; Expected date: 11/18/2024  Other orders  -     Cancel: TdaP (Boostrix) Vaccine (> 7 Y)    No follow-ups on file.    Physical  - lab orders were discussed and provided for her today   - pap smears per her Gyne  - she states that she is UTD on her Tdap and received a booster recently through [facility]  - she is UTD on immunizations    Chronic nausea, abdominal pain, bloating, diarrhea  - DD includes celiac disease, pancreatic insufficiency, IBD, IBS, diverticulosis, food allergy  - lab orders were discussed and provided for further evaluation  - CT abdomen/pelvis 5/2023 reviewed   - pending her lab evaluation results for her chronic nausea, abdominal pain, bloating, and diarrhea, we will consider possible colonoscopy for further evaluation  - we discussed that she may take zofran and/or peptobismol as needed for her symptoms while her lab results are pending     Patient verbalized understanding of assessment and recommendations. All questions and concerns were addressed.    Electronically signed by French Cao MD         [1]   Outpatient Medications Marked as Taking for the 11/18/24 encounter (Office Visit) with French Cao MD   Medication Sig Dispense Refill    ondansetron (ZOFRAN) 4 mg tablet Take 1 tablet (4 mg total) by mouth every 8 (eight) hours as needed for Nausea. 10 tablet 0     Cetirizine HCl 10 MG Oral Cap       Etonogestrel-Ethinyl Estradiol 0.12-0.015 MG/24HR Vaginal Ring INSERT 1 RING VAGINALLY FOR 3 WEEKS THEN REMOVE FOR 1 WEEK 3 Ring 3    clindamycin 2 % Vaginal Cream Place 1 applicator vaginally nightly. Use for 6 weeks 40 g 3    Multiple Vitamin (MULTIVITAMINS OR) Take by mouth.     [2]   Allergies  Allergen Reactions    Penicillins HIVES and RASH    Keflex [Cephalexin] RASH

## 2024-11-21 ENCOUNTER — TELEPHONE (OUTPATIENT)
Dept: OBGYN CLINIC | Facility: CLINIC | Age: 34
End: 2024-11-21

## 2025-01-05 ENCOUNTER — PATIENT MESSAGE (OUTPATIENT)
Dept: FAMILY MEDICINE CLINIC | Facility: CLINIC | Age: 35
End: 2025-01-05

## 2025-01-05 DIAGNOSIS — Z00.00 LABORATORY EXAMINATION ORDERED AS PART OF A ROUTINE GENERAL MEDICAL EXAMINATION: Primary | ICD-10-CM

## 2025-01-06 NOTE — TELEPHONE ENCOUNTER
LOV 11/18/2024 for physical    Ok to order cortisol lab?  Lab pended.  Routed to provider for review.

## 2025-01-08 ENCOUNTER — LAB ENCOUNTER (OUTPATIENT)
Dept: LAB | Age: 35
End: 2025-01-08
Attending: STUDENT IN AN ORGANIZED HEALTH CARE EDUCATION/TRAINING PROGRAM
Payer: COMMERCIAL

## 2025-01-08 DIAGNOSIS — Z13.220 SCREENING CHOLESTEROL LEVEL: ICD-10-CM

## 2025-01-08 DIAGNOSIS — K52.9 CHRONIC DIARRHEA: ICD-10-CM

## 2025-01-08 DIAGNOSIS — Z13.21 ENCOUNTER FOR VITAMIN DEFICIENCY SCREENING: ICD-10-CM

## 2025-01-08 DIAGNOSIS — Z13.1 DIABETES MELLITUS SCREENING: ICD-10-CM

## 2025-01-08 DIAGNOSIS — R79.89 ELEVATED TSH: ICD-10-CM

## 2025-01-08 DIAGNOSIS — Z00.00 WELL ADULT EXAM: ICD-10-CM

## 2025-01-08 DIAGNOSIS — R11.0 CHRONIC NAUSEA: ICD-10-CM

## 2025-01-08 DIAGNOSIS — R10.816 EPIGASTRIC ABDOMINAL TENDERNESS WITHOUT REBOUND TENDERNESS: ICD-10-CM

## 2025-01-08 DIAGNOSIS — R14.0 ABDOMINAL BLOATING: ICD-10-CM

## 2025-01-08 DIAGNOSIS — Z00.00 LABORATORY EXAMINATION ORDERED AS PART OF A ROUTINE GENERAL MEDICAL EXAMINATION: ICD-10-CM

## 2025-01-08 LAB
ALBUMIN SERPL-MCNC: 4.5 G/DL (ref 3.2–4.8)
ALBUMIN/GLOB SERPL: 1.5 {RATIO} (ref 1–2)
ALP LIVER SERPL-CCNC: 48 U/L
ALT SERPL-CCNC: 14 U/L
ANION GAP SERPL CALC-SCNC: 10 MMOL/L (ref 0–18)
AST SERPL-CCNC: 15 U/L (ref ?–34)
BASOPHILS # BLD AUTO: 0.05 X10(3) UL (ref 0–0.2)
BASOPHILS NFR BLD AUTO: 0.7 %
BILIRUB SERPL-MCNC: 0.4 MG/DL (ref 0.3–1.2)
BUN BLD-MCNC: 13 MG/DL (ref 9–23)
CALCIUM BLD-MCNC: 9 MG/DL (ref 8.7–10.4)
CHLORIDE SERPL-SCNC: 107 MMOL/L (ref 98–112)
CHOLEST SERPL-MCNC: 200 MG/DL (ref ?–200)
CO2 SERPL-SCNC: 22 MMOL/L (ref 21–32)
CORTIS SERPL-MCNC: 29.2 UG/DL
CREAT BLD-MCNC: 0.68 MG/DL
EGFRCR SERPLBLD CKD-EPI 2021: 117 ML/MIN/1.73M2 (ref 60–?)
EOSINOPHIL # BLD AUTO: 0.07 X10(3) UL (ref 0–0.7)
EOSINOPHIL NFR BLD AUTO: 0.9 %
ERYTHROCYTE [DISTWIDTH] IN BLOOD BY AUTOMATED COUNT: 12 %
EST. AVERAGE GLUCOSE BLD GHB EST-MCNC: 105 MG/DL (ref 68–126)
FASTING PATIENT LIPID ANSWER: YES
FASTING STATUS PATIENT QL REPORTED: YES
GLOBULIN PLAS-MCNC: 3.1 G/DL (ref 2–3.5)
GLUCOSE BLD-MCNC: 107 MG/DL (ref 70–99)
HBA1C MFR BLD: 5.3 % (ref ?–5.7)
HCT VFR BLD AUTO: 40.9 %
HDLC SERPL-MCNC: 74 MG/DL (ref 40–59)
HGB BLD-MCNC: 13.9 G/DL
IMM GRANULOCYTES # BLD AUTO: 0.02 X10(3) UL (ref 0–1)
IMM GRANULOCYTES NFR BLD: 0.3 %
LDLC SERPL CALC-MCNC: 105 MG/DL (ref ?–100)
LYMPHOCYTES # BLD AUTO: 2.6 X10(3) UL (ref 1–4)
LYMPHOCYTES NFR BLD AUTO: 34.1 %
MCH RBC QN AUTO: 30.8 PG (ref 26–34)
MCHC RBC AUTO-ENTMCNC: 34 G/DL (ref 31–37)
MCV RBC AUTO: 90.5 FL
MONOCYTES # BLD AUTO: 0.64 X10(3) UL (ref 0.1–1)
MONOCYTES NFR BLD AUTO: 8.4 %
NEUTROPHILS # BLD AUTO: 4.25 X10 (3) UL (ref 1.5–7.7)
NEUTROPHILS # BLD AUTO: 4.25 X10(3) UL (ref 1.5–7.7)
NEUTROPHILS NFR BLD AUTO: 55.6 %
NONHDLC SERPL-MCNC: 126 MG/DL (ref ?–130)
OSMOLALITY SERPL CALC.SUM OF ELEC: 289 MOSM/KG (ref 275–295)
PLATELET # BLD AUTO: 330 10(3)UL (ref 150–450)
POTASSIUM SERPL-SCNC: 4 MMOL/L (ref 3.5–5.1)
PROT SERPL-MCNC: 7.6 G/DL (ref 5.7–8.2)
RBC # BLD AUTO: 4.52 X10(6)UL
SODIUM SERPL-SCNC: 139 MMOL/L (ref 136–145)
TRIGL SERPL-MCNC: 121 MG/DL (ref 30–149)
TSI SER-ACNC: 4.38 UIU/ML (ref 0.55–4.78)
VIT D+METAB SERPL-MCNC: 9.7 NG/ML (ref 30–100)
VLDLC SERPL CALC-MCNC: 20 MG/DL (ref 0–30)
WBC # BLD AUTO: 7.6 X10(3) UL (ref 4–11)

## 2025-01-08 PROCEDURE — 36415 COLL VENOUS BLD VENIPUNCTURE: CPT

## 2025-01-08 PROCEDURE — 80053 COMPREHEN METABOLIC PANEL: CPT

## 2025-01-08 PROCEDURE — 86003 ALLG SPEC IGE CRUDE XTRC EA: CPT

## 2025-01-08 PROCEDURE — 86364 TISS TRNSGLTMNASE EA IG CLAS: CPT

## 2025-01-08 PROCEDURE — 84443 ASSAY THYROID STIM HORMONE: CPT

## 2025-01-08 PROCEDURE — 84445 ASSAY OF TSI GLOBULIN: CPT

## 2025-01-08 PROCEDURE — 82785 ASSAY OF IGE: CPT

## 2025-01-08 PROCEDURE — 83036 HEMOGLOBIN GLYCOSYLATED A1C: CPT

## 2025-01-08 PROCEDURE — 82306 VITAMIN D 25 HYDROXY: CPT

## 2025-01-08 PROCEDURE — 80061 LIPID PANEL: CPT

## 2025-01-08 PROCEDURE — 85025 COMPLETE CBC W/AUTO DIFF WBC: CPT

## 2025-01-08 PROCEDURE — 82533 TOTAL CORTISOL: CPT

## 2025-01-09 LAB — TTG IGA SER-ACNC: 0.4 U/ML (ref ?–7)

## 2025-01-10 LAB — THY STIM IMMUNO: <0.1 IU/L

## 2025-01-11 LAB
ALLERGEN BRAZIL NUT: <0.1 KUA/L (ref ?–0.1)
ALMOND IGE QN: <0.1 KUA/L (ref ?–0.1)
CASHEW NUT IGE QN: <0.1 KUA/L (ref ?–0.1)
CLAM IGE QN: <0.1 KUA/L (ref ?–0.1)
CODFISH IGE QN: <0.1 KUA/L (ref ?–0.1)
CORN IGE QN: <0.1 KUA/L (ref ?–0.1)
COW MILK IGE QN: <0.1 KUA/L (ref ?–0.1)
EGG WHITE IGE QN: <0.1 KUA/L (ref ?–0.1)
GLUTEN IGE QN: <0.1 KUA/L (ref ?–0.1)
HAZELNUT IGE QN: 0.13 KUA/L (ref ?–0.1)
IGE SERPL-ACNC: 21.7 KU/L (ref 2–214)
PEANUT IGE QN: <0.1 KUA/L (ref ?–0.1)
SALMON IGE QN: <0.1 KUA/L (ref ?–0.1)
SCALLOP IGE QN: <0.1 KUA/L (ref ?–0.1)
SESAME SEED IGE QN: <0.1 KUA/L (ref ?–0.1)
SHRIMP IGE QN: <0.1 KUA/L (ref ?–0.1)
SOYBEAN IGE QN: <0.1 KUA/L (ref ?–0.1)
WALNUT IGE QN: <0.1 KUA/L (ref ?–0.1)
WHEAT IGE QN: <0.1 KUA/L (ref ?–0.1)

## 2025-01-12 ENCOUNTER — LAB ENCOUNTER (OUTPATIENT)
Dept: LAB | Age: 35
End: 2025-01-12
Attending: STUDENT IN AN ORGANIZED HEALTH CARE EDUCATION/TRAINING PROGRAM
Payer: COMMERCIAL

## 2025-01-12 DIAGNOSIS — R14.0 ABDOMINAL BLOATING: ICD-10-CM

## 2025-01-12 DIAGNOSIS — R80.2: ICD-10-CM

## 2025-01-12 DIAGNOSIS — Z00.00 WELL ADULT EXAM: ICD-10-CM

## 2025-01-12 DIAGNOSIS — K52.9 CHRONIC DIARRHEA: Primary | ICD-10-CM

## 2025-01-12 LAB
BILIRUB UR QL: NEGATIVE
GLUCOSE UR-MCNC: NORMAL MG/DL
KETONES UR-MCNC: NEGATIVE MG/DL
LEUKOCYTE ESTERASE UR QL STRIP.AUTO: 500
NITRITE UR QL STRIP.AUTO: NEGATIVE
PH UR: 5.5 [PH] (ref 5–8)
PROT UR-MCNC: 20 MG/DL
SP GR UR STRIP: 1.03 (ref 1–1.03)
UROBILINOGEN UR STRIP-ACNC: NORMAL
WBC #/AREA URNS AUTO: >50 /HPF

## 2025-01-12 PROCEDURE — 87338 HPYLORI STOOL AG IA: CPT

## 2025-01-12 PROCEDURE — 81001 URINALYSIS AUTO W/SCOPE: CPT

## 2025-01-12 PROCEDURE — 82705 FATS/LIPIDS FECES QUAL: CPT

## 2025-01-12 PROCEDURE — 87493 C DIFF AMPLIFIED PROBE: CPT

## 2025-01-13 DIAGNOSIS — E55.9 VITAMIN D DEFICIENCY: Primary | ICD-10-CM

## 2025-01-13 DIAGNOSIS — R80.2: ICD-10-CM

## 2025-01-13 LAB — C DIFF TOX B STL QL: NEGATIVE

## 2025-01-13 RX ORDER — ERGOCALCIFEROL 1.25 MG/1
50000 CAPSULE, LIQUID FILLED ORAL WEEKLY
Qty: 4 CAPSULE | Refills: 2 | Status: SHIPPED | OUTPATIENT
Start: 2025-01-13

## 2025-01-13 NOTE — PROGRESS NOTES
Spoke with pt. patient has no symptoms of dysuria, hematuria, dysuria, flank pain, fever chills,   Symptoms she has is nausea and diarrhea.  Stool studies are pending and she was referred to GI.  - She tells me she has a history of orthostatic intermittent protein urea and was told that she would grow out of this in adulthood.  This was the reason why the urine test was done to see if she had outgrown it.  She has no symptoms.  She states she followed the protocol to obtain a clean-catch but the sample was still showed a lot of red blood cells, white blood cells protein etc. so we will repeat it with a culture.  If the urine is still dirty or with protein in it I would then refer her to a nephrologist.  -pt verbalizes understanding and agrees to plan.

## 2025-01-14 LAB
FATS NEUTRAL: NORMAL
FATS TOTAL: NORMAL
H PYLORI AG STL QL IA: NEGATIVE

## 2025-01-17 ENCOUNTER — PATIENT MESSAGE (OUTPATIENT)
Dept: FAMILY MEDICINE CLINIC | Facility: CLINIC | Age: 35
End: 2025-01-17

## 2025-01-17 DIAGNOSIS — K52.9 CHRONIC DIARRHEA: ICD-10-CM

## 2025-01-17 DIAGNOSIS — R14.0 ABDOMINAL BLOATING: Primary | ICD-10-CM

## 2025-01-20 NOTE — TELEPHONE ENCOUNTER
Future Appointments   Date Time Provider Department Center   2/19/2025 11:00 AM Sonia Smith APRN [id number]

## 2025-02-04 ENCOUNTER — LAB ENCOUNTER (OUTPATIENT)
Dept: LAB | Age: 35
End: 2025-02-04
Attending: FAMILY MEDICINE
Payer: COMMERCIAL

## 2025-02-04 DIAGNOSIS — R80.2: ICD-10-CM

## 2025-02-04 LAB
BILIRUB UR QL: NEGATIVE
GLUCOSE UR-MCNC: NORMAL MG/DL
HGB UR QL STRIP.AUTO: NEGATIVE
LEUKOCYTE ESTERASE UR QL STRIP.AUTO: NEGATIVE
NITRITE UR QL STRIP.AUTO: NEGATIVE
PH UR: 6.5 [PH] (ref 5–8)
PROT UR-MCNC: NEGATIVE MG/DL
SP GR UR STRIP: 1.02 (ref 1–1.03)
UROBILINOGEN UR STRIP-ACNC: NORMAL

## 2025-02-04 PROCEDURE — 81001 URINALYSIS AUTO W/SCOPE: CPT

## 2025-02-13 NOTE — H&P
[facility] - Gastroenterology                                                                                                               Reason for consult: diarrhea    Requesting physician or provider: Santiago Rodriguez DO    Chief Complaint   Patient presents with    Consult     Diarrhea/ nausea       HPI:   Gwen Abdullahi is a 34 year old year-old female with active diagnoses including environmental allergies. Prior medical/surgical history cholecystectomy 2023 and other hx in note table.    she is here today for evaluation  #diarrhea  #nausea  #generalized abdominal pain  -she reports IBS with diarrhea symptoms since 2016. Reports more diarrhea and cramping since cholecystectomy and diarrhea worse when stressed. Takes imodium occasionally.   -bowel habits range from 1-2 daily. Consistency ranges from formed to frequent loose stool which may be yellow/green and oily  -she has frequent abdominal pain of variable locations, including RUQ  -H. Pylori, C Diff and fecal fat normal in January   -celiac serology negative in January    #belching  #nausea  #GERD  -reports intermittent nausea and belching since 2010. Occasional hoarse voice  -occasional take tums for indigestion belching which does not help. Less frequent heartburn     Patient denies symptoms of vomiting, dysphagia, odynophagia, globus sensation, hematemesis, constipation, hematochezia, or melena. she denies recent change in appetite, fever or unintentional weight loss.      Last colonoscopy: 2014 @ [facility]  Indication: rectal bleeding, change in bowel function, abdominal cramps  Finding  -internal hemorrhoids    Last EGD: none     NSAIDS: none   Tobacco: none   Alcohol: 4 drinks weekly   Marijuana: none   Illicit drugs: none     FH GI malignancy: maternal uncle - colon cancer   FH IBD: none     No history of adverse reaction to sedation  No JOHN  No  anticoagulants/antiplatelet  No pacemaker/defibrillator    Wt Readings from Last 6 Encounters:   02/19/25 193 lb (87.5 kg)   11/18/24 195 lb (88.5 kg)   11/14/24 191 lb (86.6 kg)   01/06/24 170 lb (77.1 kg)   09/21/23 165 lb (74.8 kg)   05/25/23 150 lb (68 kg)        History, Medications, Allergies, ROS:      Past Medical History:    Anesthesia complication    Esophageal reflux    High serum thyroid stimulating hormone (TSH)    Intermittent orthostatic proteinuria    PONV (postoperative nausea and vomiting)    Vitamin D deficiency      Past Surgical History:   Procedure Laterality Date    Yury localization wire 1 site right (cpt=19281)  2017    fibroadenoma    Other surgical history      right breast biopsy 2017 or 2018    Other surgical history      Bunionectomy 17yrs ago    Other surgical history      colonoscopy    Other surgical history Right     eye Lasik    Removal gallbladder  2023      Family Hx:   Family History   Problem Relation Age of Onset    Other (fibroids) Mother         hysterectomy @ 43 yo    Diabetes Mother     Diabetes Father     Other (fibroids) Maternal Grandmother     Breast Cancer Paternal Cousin Female         post-menopause    Colon Cancer Maternal Uncle       Social History:   Social History     Socioeconomic History    Marital status: Single   Tobacco Use    Smoking status: Never    Smokeless tobacco: Never   Vaping Use    Vaping status: Never Used   Substance and Sexual Activity    Alcohol use: Yes     Comment: 3-4 times per week 1 drink    Drug use: Never    Sexual activity: Not Currently     Comment: none   Other Topics Concern    Caffeine Concern Yes    Exercise Yes    Seat Belt Yes    Blood Transfusions No     Social Drivers of Health      Received from [facility], [facility]    Housing Stability        Medications (Active prior to today's visit):  Current Outpatient Medications   Medication Sig Dispense Refill    Omeprazole 40 MG Oral Capsule  Delayed Release Take 1 capsule (40 mg total) by mouth daily. Take 1 capsule by mouth daily before breakfast. 90 capsule 3    colestipol 1 g Oral Tab Take 2 tablets (2 g total) by mouth at bedtime. 180 tablet 0    ergocalciferol 1.25 MG (69255 UT) Oral Cap Take 1 capsule (50,000 Units total) by mouth once a week. 4 capsule 2    ondansetron (ZOFRAN) 4 mg tablet Take 1 tablet (4 mg total) by mouth every 8 (eight) hours as needed for Nausea. 10 tablet 0    Cetirizine HCl 10 MG Oral Cap       Etonogestrel-Ethinyl Estradiol 0.12-0.015 MG/24HR Vaginal Ring INSERT 1 RING VAGINALLY FOR 3 WEEKS THEN REMOVE FOR 1 WEEK 3 Ring 3    Multiple Vitamin (MULTIVITAMINS OR) Take by mouth.         Allergies:  Allergies[1]    ROS:   CONSTITUTIONAL: negative for fevers, chills, sweats  EYES Negative for scleral icterus or redness, and diplopia  HEENT: Negative for hoarseness  RESPIRATORY: Negative for cough and severe shortness of breath  CARDIOVASCULAR: Negative for crushing sub-sternal chest pain  GASTROINTESTINAL: See HPI  GENITOURINARY: Negative for dysuria  MUSCULOSKELETAL: Negative for arthralgias and myalgias  SKIN: Negative for jaundice, rash or pruritus  NEUROLOGICAL: Negative for dizziness and headaches  BEHAVIOR/PSYCH: Negative for psychotic behavior    PHYSICAL EXAM:   Blood pressure 132/89, pulse 101, height 5' 6\" (1.676 m), weight 193 lb (87.5 kg), last menstrual period 10/17/2024.    GEN: Alert, no acute distress, well-nourished   HEENT: anicteric sclera, neck supple, trachea midline, MMM, no palpable or tender neck or supraclavicular lymph nodes  CV: RRR, the extremities are warm and well perfused   LUNGS: No increased work of breathing, CTAB  ABDOMEN: Soft, symmetrical, non-tender without distention or guarding. No lesions. Aorta is without bruit or visible pulsation. Umbilicus is midline without herniation. Normoactive bowel sounds are present, No masses, hepatomegaly or splenomegaly noted. +laparoscopic scars  MSK: No  erythema, no warmth, no swelling of joints  SKIN: No jaundice, no erythema, no rashes, no lesions  HEMATOLOGIC: No bleeding, no bruising  NEURO: Alert and interactive, SIMMS  PSYCH: appropriate mood & affect    Labs/Imaging/Procedures:     Patient's pertinent labs and imaging were reviewed and discussed with patient today.        .  ASSESSMENT/PLAN:   Gwen Abdullahi is a 34 year old year-old female with active diagnoses including environmental allergies. Prior medical/surgical history cholecystectomy 2023 and other hx in note table.    she is here today for evaluation  #diarrhea  #nausea  #generalized abdominal pain  -she reports IBS with diarrhea symptoms since 2016. Reports more diarrhea and cramping since cholecystectomy and diarrhea worse when stressed. Takes imodium occasionally.   -bowel habits range from 1-2 daily. Consistency ranges from formed to frequent loose stool which may be yellow/green and oily  -she has frequent abdominal pain of variable locations, including RUQ  -H. Pylori, C Diff and fecal fat normal in January   -celiac serology negative in January  -recommend bile acid sequestrant and working with dietician. If not improving may trial anti spasmodic.   CT abdomen/pelvis ordered to evaluate for inflammatory process, lesion or structural changes. Prior CT in 2023 notable for diverticulosis. She did have cholelithiasis at that time, but is now s/p cholecystectomy.      #belching  #nausea  #GERD  -reports intermittent nausea and belching since 2010. Occasional hoarse voice  -occasional take tums for indigestion belching which does not help. Less frequent heartburn   -recommend omeprazole for GERD along with dietician to work on avoiding triggers    Recommendations:  -work with dietician for irritable bowel syndrome with diarrhea and post gallbladder removal state    -start taking omeprazole 40mg daily for acid reflux    -start taking colestipol nightly. This is a bile acid sequestrant. This may help  with diarrhea.     -call to schedule CT scan #[phone]    -follow up in 3-6 months        Orders This Visit:  No orders of the defined types were placed in this encounter.      Meds This Visit:  Requested Prescriptions     Signed Prescriptions Disp Refills    Omeprazole 40 MG Oral Capsule Delayed Release 90 capsule 3     Sig: Take 1 capsule (40 mg total) by mouth daily. Take 1 capsule by mouth daily before breakfast.    colestipol 1 g Oral Tab 180 tablet 0     Sig: Take 2 tablets (2 g total) by mouth at bedtime.       Imaging & Referrals:  DIETITIAN EDUCATION INITIAL, DIET (INTERNAL)  CT ABDOMEN+PELVIS(CONTRAST ONLY)(RSF=[id number])      CASSIUS Durand    [facility]astro[facility]  2/19/2025        This note was partially prepared using Dragon Medical voice recognition dictation software. As a result, errors may occur. When identified, these errors have been corrected. While every attempt is made to correct errors during dictation, discrepancies may still exist.          [1]   Allergies  Allergen Reactions    Penicillins HIVES and RASH    Keflex [Cephalexin] RASH

## 2025-02-19 ENCOUNTER — OFFICE VISIT (OUTPATIENT)
Facility: CLINIC | Age: 35
End: 2025-02-19
Payer: COMMERCIAL

## 2025-02-19 VITALS
HEART RATE: 101 BPM | HEIGHT: 66 IN | BODY MASS INDEX: 31.02 KG/M2 | SYSTOLIC BLOOD PRESSURE: 132 MMHG | WEIGHT: 193 LBS | DIASTOLIC BLOOD PRESSURE: 89 MMHG

## 2025-02-19 DIAGNOSIS — R14.2 BELCHING: ICD-10-CM

## 2025-02-19 DIAGNOSIS — R11.0 NAUSEA: ICD-10-CM

## 2025-02-19 DIAGNOSIS — R10.84 GENERALIZED ABDOMINAL PAIN: ICD-10-CM

## 2025-02-19 DIAGNOSIS — R10.9 ABDOMINAL CRAMPING: ICD-10-CM

## 2025-02-19 DIAGNOSIS — R19.7 DIARRHEA, UNSPECIFIED TYPE: Primary | ICD-10-CM

## 2025-02-19 DIAGNOSIS — R49.0 HOARSENESS: ICD-10-CM

## 2025-02-19 RX ORDER — COLESTIPOL HYDROCHLORIDE 1 G/1
2 TABLET ORAL NIGHTLY
Qty: 180 TABLET | Refills: 0 | Status: SHIPPED | OUTPATIENT
Start: 2025-02-19 | End: 2025-05-20

## 2025-02-19 RX ORDER — OMEPRAZOLE 40 MG/1
40 CAPSULE, DELAYED RELEASE ORAL DAILY
Qty: 90 CAPSULE | Refills: 3 | Status: SHIPPED | OUTPATIENT
Start: 2025-02-19 | End: 2026-02-14

## 2025-02-19 NOTE — PATIENT INSTRUCTIONS
-work with dietician for irritable bowel syndrome with diarrhea and post gallbladder removal state    -start taking omeprazole 40mg daily for acid reflux    -start taking colestipol nightly. This is a bile acid sequestrant. This may help with diarrhea.     -call to schedule CT scan #[phone]    -follow up in 3-6 months

## 2025-02-21 ENCOUNTER — TELEPHONE (OUTPATIENT)
Dept: CASE MANAGEMENT | Age: 35
End: 2025-02-21

## 2025-02-21 ENCOUNTER — PATIENT MESSAGE (OUTPATIENT)
Dept: CASE MANAGEMENT | Age: 35
End: 2025-02-21

## 2025-02-21 NOTE — TELEPHONE ENCOUNTER
CT Abdomen & Pelvis        Status: DENIED        Reference number [id number]     A copy of the denial letter is filed under the MEDIA tab, reference for complete details. You may reach out to Severiano at [phone] to discuss decision.     Please reach out to patient with plan of care.      Thank you

## 2025-02-21 NOTE — TELEPHONE ENCOUNTER
Left voicemail & sent mychart to patient. Advised to cancel CT at this time and can reassess at follow up visit pending her insurance change coming up.

## 2025-02-21 NOTE — TELEPHONE ENCOUNTER
Sudhir Scott    Please see notes from PPD.    CT abdomen/pelvis is denied, the patient is scheduled on 2/26/2025.    Thank you.

## 2025-02-21 NOTE — TELEPHONE ENCOUNTER
Left a message in her voicemail per release of information.    Informed that the CT abdomen/pelvis scheduled on 2/26/2025 was denied.    I asked the patient to call the office prior to having the imaging done.

## 2025-02-25 ENCOUNTER — OFFICE VISIT (OUTPATIENT)
Dept: FAMILY MEDICINE CLINIC | Facility: CLINIC | Age: 35
End: 2025-02-25
Payer: COMMERCIAL

## 2025-02-25 VITALS
RESPIRATION RATE: 16 BRPM | DIASTOLIC BLOOD PRESSURE: 70 MMHG | BODY MASS INDEX: 31.22 KG/M2 | TEMPERATURE: 98 F | WEIGHT: 194.25 LBS | HEIGHT: 66 IN | HEART RATE: 94 BPM | SYSTOLIC BLOOD PRESSURE: 120 MMHG | OXYGEN SATURATION: 98 %

## 2025-02-25 DIAGNOSIS — J30.9 CHRONIC ALLERGIC RHINITIS: ICD-10-CM

## 2025-02-25 DIAGNOSIS — J45.20 MILD INTERMITTENT REACTIVE AIRWAY DISEASE WITHOUT COMPLICATION (HCC): ICD-10-CM

## 2025-02-25 DIAGNOSIS — E55.9 VITAMIN D DEFICIENCY: Primary | ICD-10-CM

## 2025-02-25 DIAGNOSIS — Z13.21 ENCOUNTER FOR VITAMIN DEFICIENCY SCREENING: ICD-10-CM

## 2025-02-25 PROCEDURE — 99214 OFFICE O/P EST MOD 30 MIN: CPT | Performed by: STUDENT IN AN ORGANIZED HEALTH CARE EDUCATION/TRAINING PROGRAM

## 2025-02-25 RX ORDER — ALBUTEROL SULFATE 90 UG/1
2 INHALANT RESPIRATORY (INHALATION) EVERY 4 HOURS PRN
Qty: 6.7 G | Refills: 0 | Status: SHIPPED | OUTPATIENT
Start: 2025-02-25

## 2025-02-25 RX ORDER — PREDNISONE 20 MG/1
20 TABLET ORAL DAILY
Qty: 5 TABLET | Refills: 0 | Status: SHIPPED | OUTPATIENT
Start: 2025-02-25 | End: 2025-03-02

## 2025-02-25 RX ORDER — BENZONATATE 100 MG/1
100 CAPSULE ORAL 3 TIMES DAILY PRN
Qty: 30 CAPSULE | Refills: 0 | Status: SHIPPED | OUTPATIENT
Start: 2025-02-25 | End: 2025-03-07

## 2025-02-25 NOTE — PROGRESS NOTES
Subjective:      Chief Complaint   Patient presents with    Cough     X 3 weeks    Follow - Up     From last visit     HISTORY OF PRESENT ILLNESS  HPI  HPI obtained per patient report.  Gwen Abdullahi is a pleasant 34 year old female presenting for a follow-up and a cough.   She saw her GI last week and will be starting her prescribed medications shortly.   She reports a dry cough and chest tightness with exhalation for 3 weeks without improvement. Her cough seems to be triggered by exposure to cold temperatures. She initially also had B/L alternating ear pain but this has resolved. She notes chronic PND as well. She tried Zyrtec without improvement. She has a history of exercise-induced asthma during childhood.     PAST PATIENT HISTORY  Past Medical History:    Anesthesia complication    Esophageal reflux    High serum thyroid stimulating hormone (TSH)    Hypercholesteremia    IFG (impaired fasting glucose)    Intermittent orthostatic proteinuria    PONV (postoperative nausea and vomiting)    Vitamin D deficiency     Past Surgical History:   Procedure Laterality Date    Cholecystectomy      Colonoscopy      Yury localization wire 1 site right (cpt=19281)  2017    fibroadenoma    Other surgical history      right breast biopsy 2017 or 2018    Other surgical history      Bunionectomy 17yrs ago    Other surgical history      colonoscopy    Other surgical history Right     eye Lasik    Removal gallbladder  2023       CURRENT MEDICATIONS  Medications Taking[1]    HEALTH MAINTENANCE  Immunization History   Administered Date(s) Administered    Covid-19 Vaccine Moderna 100 mcg/0.5 ml 01/21/2021, 02/18/2021    Covid-19 Vaccine Moderna 50 Mcg/0.25 Ml 12/09/2021    HEP B, Ped/Adol 05/03/2000, 06/07/2000, 11/01/2000    Influenza 12/19/2016, 09/01/2019, 09/01/2021, 11/26/2022, 11/24/2023, 10/04/2024    MMR 10/05/2013, 11/05/2013    Varicella 10/05/2013, 11/05/2013       ALLERGIES AND DRUG REACTIONS  Allergies[2]    Family History    Problem Relation Age of Onset    Other (fibroids) Mother         hysterectomy @ 43 yo    Diabetes Mother     Diabetes Father     Other (fibroids) Maternal Grandmother     Breast Cancer Paternal Cousin Female         post-menopause    Colon Cancer Maternal Uncle      Social History     Socioeconomic History    Marital status: Single   Tobacco Use    Smoking status: Never    Smokeless tobacco: Never   Vaping Use    Vaping status: Never Used   Substance and Sexual Activity    Alcohol use: Yes     Alcohol/week: 4.0 standard drinks of alcohol     Types: 4 Standard drinks or equivalent per week     Comment: 3-4 times per week 1 drink    Drug use: Never    Sexual activity: Not Currently     Comment: none   Other Topics Concern    Caffeine Concern Yes    Exercise Yes    Seat Belt Yes    Weight Concern No    Blood Transfusions No     Social Drivers of Health      [facility] from [facility]    Housing Stability       Review of Systems   HENT:  Positive for postnasal drip.    Respiratory:  Positive for cough and chest tightness.    Gastrointestinal:  Positive for abdominal distention, abdominal pain, diarrhea and nausea.   All other systems reviewed and are negative.         Objective:      /70   Pulse 94   Temp 97.9 °F (36.6 °C) (Temporal)   Resp 16   Ht 5' 6\" (1.676 m)   Wt 194 lb 4 oz (88.1 kg)   LMP 02/12/2025 (Approximate)   SpO2 98%   BMI 31.35 kg/m²   Body mass index is 31.35 kg/m².    Physical Exam  Vitals reviewed.   Constitutional:       General: She is not in acute distress.     Appearance: She is not ill-appearing, toxic-appearing or diaphoretic.   HENT:      Head: Normocephalic and atraumatic.      Right Ear: Tympanic membrane, ear canal and external ear normal.      Left Ear: Tympanic membrane, ear canal and external ear normal.   Eyes:      General: No scleral icterus.        Right eye: No discharge.         Left eye: No discharge.       Extraocular Movements: Extraocular movements intact.      Conjunctiva/sclera: Conjunctivae normal.   Neck:      Thyroid: No thyroid mass, thyromegaly or thyroid tenderness.   Cardiovascular:      Rate and Rhythm: Normal rate and regular rhythm.      Heart sounds: Normal heart sounds.   Pulmonary:      Effort: Pulmonary effort is normal.      Breath sounds: Normal breath sounds.      Comments: Diminished breath sounds to B/L lung bases    Musculoskeletal:      Cervical back: Neck supple. No tenderness.      Right lower leg: No edema.      Left lower leg: No edema.   Lymphadenopathy:      Cervical: No cervical adenopathy.   Neurological:      Mental Status: She is alert and oriented to person, place, and time.            Assessment and Plan:      1. Vitamin D deficiency (Primary)  2. Encounter for vitamin deficiency screening  -     Iron And Tibc; Future; Expected date: 02/25/2025  -     Ferritin; Future; Expected date: 02/25/2025  3. Mild intermittent reactive airway disease without complication (HCC)  -     Albuterol Sulfate HFA; Inhale 2 puffs into the lungs every 4 (four) hours as needed for Shortness of Breath or Wheezing.  Dispense: 6.7 g; Refill: 0  -     predniSONE; Take 1 tablet (20 mg total) by mouth daily for 5 days.  Dispense: 5 tablet; Refill: 0  -     Benzonatate; Take 1 capsule (100 mg total) by mouth 3 (three) times daily as needed for cough. SWALLOW CAPSULE WHOLE. DO NOT BREAK, CUT, CRUSH, CHEW, OR DISSOLVE CAPSULE.  Dispense: 30 capsule; Refill: 0  4. Chronic allergic rhinitis    Return if symptoms worsen or fail to improve.    Vitamin D deficiency  - she has been prescribed a 3 month course of prescription-strength vitamin D supplementation and has been recommended to recheck her vitamin D level after finishing this course  - due to her ongoing GI symptoms she is concerned about potential malabsorption of other vitamins/minerals, so a shared decision was made to obtain an iron panel with her next  set of labs    RAD  - history of exercise-induced asthma during childhood  - current episode was likely triggered by a viral URI   - prescriptions for prednisone course, as-needed albuterol inhaler, and as-needed tessalon perles have been sent for her  - if her symptoms do not improve despite the above, we will consider starting daily singulair or a steroid inhaler     Patient verbalized understanding of assessment and recommendations. All questions and concerns were addressed.    Electronically signed by French Cao MD         [1]   Outpatient Medications Marked as Taking for the 2/25/25 encounter (Office Visit) with French Cao MD   Medication Sig Dispense Refill    albuterol 108 (90 Base) MCG/ACT Inhalation Aero Soln Inhale 2 puffs into the lungs every 4 (four) hours as needed for Shortness of Breath or Wheezing. 6.7 g 0    predniSONE 20 MG Oral Tab Take 1 tablet (20 mg total) by mouth daily for 5 days. 5 tablet 0    benzonatate 100 MG Oral Cap Take 1 capsule (100 mg total) by mouth 3 (three) times daily as needed for cough. SWALLOW CAPSULE WHOLE. DO NOT BREAK, CUT, CRUSH, CHEW, OR DISSOLVE CAPSULE. 30 capsule 0    ergocalciferol 1.25 MG (58895 UT) Oral Cap Take 1 capsule (50,000 Units total) by mouth once a week. 4 capsule 2    ondansetron (ZOFRAN) 4 mg tablet Take 1 tablet (4 mg total) by mouth every 8 (eight) hours as needed for Nausea. 10 tablet 0    Cetirizine HCl 10 MG Oral Cap       Etonogestrel-Ethinyl Estradiol 0.12-0.015 MG/24HR Vaginal Ring INSERT 1 RING VAGINALLY FOR 3 WEEKS THEN REMOVE FOR 1 WEEK 3 Ring 3    Multiple Vitamin (MULTIVITAMINS OR) Take by mouth.     [2]   Allergies  Allergen Reactions    Penicillins HIVES and RASH    Keflex [Cephalexin] RASH

## 2025-03-05 ENCOUNTER — PATIENT MESSAGE (OUTPATIENT)
Facility: CLINIC | Age: 35
End: 2025-03-05

## 2025-03-25 ENCOUNTER — PATIENT MESSAGE (OUTPATIENT)
Facility: CLINIC | Age: 35
End: 2025-03-25

## 2025-03-25 DIAGNOSIS — R11.0 NAUSEA: Primary | ICD-10-CM

## 2025-03-27 RX ORDER — ONDANSETRON 4 MG/1
4 TABLET, ORALLY DISINTEGRATING ORAL EVERY 8 HOURS PRN
Qty: 30 TABLET | Refills: 1 | Status: SHIPPED | OUTPATIENT
Start: 2025-03-27

## 2025-03-27 NOTE — TELEPHONE ENCOUNTER
Gwen MARIE  [facility] Clinical Staff (supporting CASSIUS Durand)2 days ago     EDUARDO Scott,     I am at the month chandana of taking the omeprazole and colestipol. For the last almost 2 weeks I have noticed a decrease in appetite and an increase in nausea throughout the day. I am not eating much throughout the day and am starting to feel unwell. I'm not sure if I should stop the omeprazole or minimize my dose of colestipol? Seeking guidance on my next steps.     Thank you,     Gwen Abdullahi

## 2025-04-03 ENCOUNTER — TELEPHONE (OUTPATIENT)
Facility: CLINIC | Age: 35
End: 2025-04-03

## 2025-04-03 NOTE — TELEPHONE ENCOUNTER
1st reminder letter sent out via My Chart for the following:    CT ABDOMEN+PELVIS(CONTRAST ONLY)(CPT=74177) (Order #[id number]) on 2/19/25

## 2025-05-08 NOTE — TELEPHONE ENCOUNTER
2nd reminder letter sent out via My Chart for the following:     CT ABDOMEN+PELVIS(CONTRAST ONLY)(CPT=74177) (Order #[id number]) on 2/19/25

## 2025-06-10 NOTE — TELEPHONE ENCOUNTER
3RDreminder letter sent out via My Chart for the following:     CT ABDOMEN+PELVIS(CONTRAST ONLY)(CPT=74177) (Order #[id number]) on 2/19/25

## 2025-06-17 ENCOUNTER — OFFICE VISIT (OUTPATIENT)
Dept: GASTROENTEROLOGY | Facility: CLINIC | Age: 35
End: 2025-06-17
Payer: COMMERCIAL

## 2025-06-17 VITALS
HEIGHT: 66 IN | HEART RATE: 91 BPM | WEIGHT: 188 LBS | DIASTOLIC BLOOD PRESSURE: 84 MMHG | SYSTOLIC BLOOD PRESSURE: 124 MMHG | BODY MASS INDEX: 30.22 KG/M2

## 2025-06-17 DIAGNOSIS — K58.0 IRRITABLE BOWEL SYNDROME WITH DIARRHEA: ICD-10-CM

## 2025-06-17 DIAGNOSIS — R10.84 GENERALIZED ABDOMINAL PAIN: ICD-10-CM

## 2025-06-17 DIAGNOSIS — K21.9 GASTROESOPHAGEAL REFLUX DISEASE, UNSPECIFIED WHETHER ESOPHAGITIS PRESENT: ICD-10-CM

## 2025-06-17 DIAGNOSIS — R49.0 HOARSENESS: ICD-10-CM

## 2025-06-17 DIAGNOSIS — R10.9 ABDOMINAL CRAMPING: ICD-10-CM

## 2025-06-17 DIAGNOSIS — R11.0 NAUSEA: Primary | ICD-10-CM

## 2025-06-17 DIAGNOSIS — R19.7 DIARRHEA, UNSPECIFIED TYPE: ICD-10-CM

## 2025-06-17 PROBLEM — K80.20 CALCULUS OF GALLBLADDER WITHOUT CHOLECYSTITIS WITHOUT OBSTRUCTION: Status: RESOLVED | Noted: 2023-05-09 | Resolved: 2025-06-17

## 2025-06-17 PROCEDURE — 3074F SYST BP LT 130 MM HG: CPT | Performed by: INTERNAL MEDICINE

## 2025-06-17 PROCEDURE — 3008F BODY MASS INDEX DOCD: CPT | Performed by: INTERNAL MEDICINE

## 2025-06-17 PROCEDURE — 3079F DIAST BP 80-89 MM HG: CPT | Performed by: INTERNAL MEDICINE

## 2025-06-17 PROCEDURE — 99215 OFFICE O/P EST HI 40 MIN: CPT | Performed by: INTERNAL MEDICINE

## 2025-06-17 RX ORDER — FAMOTIDINE 20 MG/1
TABLET, FILM COATED ORAL 2 TIMES DAILY
Qty: 180 TABLET | Refills: 3 | Status: SHIPPED | OUTPATIENT
Start: 2025-06-17 | End: 2025-09-15

## 2025-06-17 RX ORDER — ONDANSETRON 4 MG/1
4 TABLET, ORALLY DISINTEGRATING ORAL EVERY 8 HOURS PRN
Qty: 60 TABLET | Refills: 5 | Status: SHIPPED | OUTPATIENT
Start: 2025-06-17

## 2025-06-17 NOTE — PROGRESS NOTES
** Scroll down for HPI. **    ASSESSMENT/PLAN:     34 year old fit healthy woman who returns today for follow-up.    As per Sonia HAMMONDS's 2/19/2025 notes below, she has a previous diagnosis irritable bowel syndrome-diarrhea, which along with nausea has been much worse since cholecystectomy surgery May 2023.    She reports reassuring previous colonoscopy examination for rectal bleeding, blood in stools in 2014.    Some question of GERD symptoms, throat clearing.    Suggest:    Irritable bowel syndrome-diarrhea  Stable history of mild chronic nonbloody diarrhea aggravated by cholecystectomy 2 years ago May 2023 reviewed today  Likely significant opponent of postcholecystectomy bile salt malabsorption diarrhea may be gradually improving  Reassuring CT scan May 2023 during workup for cholelithiasis and cholecystitis  Negative TTG IgA celiac assay 1/8/2025  Reported previous reassuring colonoscopy examination approximately 2014  Remarkably good control on minimal doses colestipol tablets, now taking as needed/preemptively rather than regularly  Unclear if nausea and motion sickness below relate to this concern  Multiple lifestyle changes including dietary changes, avoiding fatty fried meals, yogurt and Kombucha multiple days per week all have helped    Today I advised continuing current pattern of colestipol use on an as-needed and preemptive basis.  No need to take regularly if not necessary, also could be constipating.    Overall symptoms have been improving.  If symptoms again flare, further workup to be considered could include the repeat CT scan, trial of rifaximin antibiotic, repeat colonoscopy/ileoscopy examination    High insurance deductible --> I recommended [facility] for future CT scan(s)    Nausea, motion sickness     Nausea has been significant since the cholecystectomy surgery  Sounds like nausea is most noticeable in the mornings, also very low threshold for motion sickness  Unclear whether  antacid therapy with omeprazole made much difference  Zofran ondansetron does give relief; new prescription sent in today    GERD symptoms, throat symptoms including hoarseness and throat clearing     Suspected multifactorial process reviewed together today.  Possibilities of GERD symptoms, allergies and postnasal drip reviewed  Recent trial omeprazole medication may not of help much, may have aggravated nausea as per HPI 6/17/2025  Today I offered reassurance, consideration for Pepcid famotidine.  New prescription sent in for Pepcid famotidine.    Abdominal pain multifocal     Negative H. pylori stool fecal antigen assay 1/12/2025  Negative TTG IgA celiac assay 1/8/2025  Currently describes pain diffuse upper and epigastrium region, diffuse right-sided, periumbilical  Overall much improved, now feeling about once per week on follow-up visit 6/17/2025  Reassurance offered, continue above plan; could consider imaging or colonoscopy examination in the future    Colon cancer screening, average risk     Consideration for colonoscopy examination if symptoms persist or worsen  Commence screening colonoscopy examinations at age 45 or as per family history        Office visit 6/17/2025:  HPI:    Patient ID: Gwen Abdullahi is a 34 year old fit healthy woman who returns today for follow-up.    As per Sonia HAMMONDS's 2/19/2025 notes below, she has a previous diagnosis irritable bowel syndrome-diarrhea, which along with nausea has been much worse since cholecystectomy surgery May 2023.    She reports reassuring previous colonoscopy examination for rectal bleeding, blood in stools in 2014.    Some question of GERD symptoms, throat clearing.    Last time, Sonia HAMMONDS recommended and prescribed omeprazole, ondansetron, colestipol for these issues including clinical suspicion for postcholecystectomy bile salt malabsorption diarrhea.    On follow-up today, Ms. Abdullahi states that the colestipol has worked remarkably well.   On the above cocktail, it seemed like her nausea worsened.    She discontinued the omeprazole about 2 weeks after starting the combination to see if it was aggravating the nausea and continued focusing on the colestipol for her chronic diarrhea.    Colestipol when she takes it gives excellent control.  With diet and lifestyle measures, the question of some slow spontaneous improvement she seems to be requiring less and less colestipol.  Gwen says if she now takes a dose every other day or less, often either on an as-needed basis or preoperatively at bedtime before a busy day or before day of known triggers.    Identified triggers include heavy fatty meals, cookouts.    On current dosing, taking the colestipol as needed, every other day or less she reports passing a normal stool every morning.    Other changes to her diet and lifestyle have included increasing her yogurt and Kombucha  intake.    In February, Sonia ordered CT abd/pelvis which has not been completed.  Previously discussed abdominal pain is much better.  Due to high insurance deductible, she did not complete the CT scan ordered in February.  Abdominal pain is now present about once per week.  Very reassuring.  We reviewed the previous CT scan 2 years ago.    Recent labs 1/8/2025 included normal CBC/CMP/TSH; Negative TTG celiac assay    Negative H. pylori fecal antigen assay on sample 1/12/2025    Wt Readings from Last 20 Encounters:   06/17/25 188 lb (85.3 kg)   02/25/25 194 lb 4 oz (88.1 kg)   02/19/25 193 lb (87.5 kg)   11/18/24 195 lb (88.5 kg)   11/14/24 191 lb (86.6 kg)   01/06/24 170 lb (77.1 kg)   09/21/23 165 lb (74.8 kg)   05/25/23 150 lb (68 kg)   05/23/23 151 lb (68.5 kg)   05/11/23 151 lb (68.5 kg)   05/09/23 152 lb (68.9 kg)   05/08/23 152 lb (68.9 kg)   12/18/22 170 lb (77.1 kg)   07/12/22 175 lb 6.4 oz (79.6 kg)   03/17/22 176 lb (79.8 kg)         Previous EGD examination: n  Previous colonoscopy(ies): ?2014    HPI    Review of  Systems    ======================  Previous visit Sonia Smith APRN 2/19/2025:       HPI:   Gwen Abdullahi is a 34 year old year-old female with active diagnoses including environmental allergies. Prior medical/surgical history cholecystectomy 2023 and other hx in note table.     she is here today for evaluation  #diarrhea  #nausea  #generalized abdominal pain  -she reports IBS with diarrhea symptoms since 2016. Reports more diarrhea and cramping since cholecystectomy and diarrhea worse when stressed. Takes imodium occasionally.   -bowel habits range from 1-2 daily. Consistency ranges from formed to frequent loose stool which may be yellow/green and oily  -she has frequent abdominal pain of variable locations, including RUQ  -H. Pylori, C Diff and fecal fat normal in January   -celiac serology negative in January     #belching  #nausea  #GERD  -reports intermittent nausea and belching since 2010. Occasional hoarse voice  -occasional take tums for indigestion belching which does not help. Less frequent heartburn      Patient denies symptoms of vomiting, dysphagia, odynophagia, globus sensation, hematemesis, constipation, hematochezia, or melena. she denies recent change in appetite, fever or unintentional weight loss.       Last colonoscopy: 2014 @ [facility]  Indication: rectal bleeding, change in bowel function, abdominal cramps  Finding  -internal hemorrhoids     Last EGD: none      NSAIDS: none   Tobacco: none   Alcohol: 4 drinks weekly   Marijuana: none   Illicit drugs: none      FH GI malignancy: maternal uncle - colon cancer   FH IBD: none      No history of adverse reaction to sedation  No JOHN  No anticoagulants/antiplatelet  No pacemaker/defibrillator    =======================  5/6/2023:  CT APPENDIX ABD/PEL W CONTRAST (CPT=74177)     LOCATION:  [facility]       COMPARISON:  None.     INDICATIONS:  Abdominal Pain     TECHNIQUE:  Axial helical acquisitions are obtained from through the abdomen and pelvis after bolus  intravenous nonionic contrast administration.       PATIENT STATED HISTORY:(As transcribed by Technologist)  Patient states to have right upper quadrant, right lower quadrant and right flank pain, nausea and diarrhea for 1 day.      CONTRAST USED:  85cc of Isovue 370     FINDINGS:    LUNG BASES:  Lung bases are clear.  LIVER:  Normal in shape and contour.  BILIARY:  Gallbladder is contracted.  Cholelithiasis.  No intrahepatic biliary dilatation..  SPLEEN:  Normal.  No enlargement or focal lesion.  PANCREAS:  No sangeeta-pancreatic inflammatory stranding  ADRENALS:  Normal.  No mass or enlargement.    KIDNEYS:  Kidneys are symmetrical in size without evidence of hydronephrosis.  BOWEL/MESENTERY:  Bowel is normal in caliber. No evidence of obstruction.  The appendix is normal in appearance.  Minimal colonic diverticulosis without evidence of diverticulitis.  PELVIS:  Bladder is nondistended.  There is bladder wall thickening present.  No free pelvic fluid.    AORTA/VASCULAR:  Aorta is normal in caliber  BONES:  No acute fractures.         CONCLUSION:    1. Bladder wall thickening which may be secondary to incomplete distension but correlation with urinalysis is recommended.  2. Contracted gallbladder with cholelithiasis.  3. Minimal colonic diverticulosis without evidence of diverticulitis.          Dictated by (CST): Florecnio May MD on 5/06/2023 at 11:21 AM                     Current Medications[1]      Allergies:Allergies[2]  Imaging: No results found.       PHYSICAL EXAM:   Physical Exam                   Over 65 minutes spent today discussing the above and counseling and educating this patient, reviewing chart notes and data and documenting clinical information in the EHR, independently interpreting results and communicating results to the patient/family and composing this comprehensive note, ordering medications or testing, referring and communicating with other healthcare providers, and care coordination with  the patient's other providers.      Digital transcription software was utilized to produce this note. The note was proofread for content only. Typographical errors may remain.       Meds This Visit:  Requested Prescriptions      No prescriptions requested or ordered in this encounter       Imaging & Referrals:  None       ID#[id number]         [1]   Current Outpatient Medications   Medication Sig Dispense Refill    ondansetron 4 MG Oral Tablet Dispersible Take 1 tablet (4 mg total) by mouth every 8 (eight) hours as needed. 30 tablet 1    albuterol 108 (90 Base) MCG/ACT Inhalation Aero Soln Inhale 2 puffs into the lungs every 4 (four) hours as needed for Shortness of Breath or Wheezing. 6.7 g 0    Omeprazole 40 MG Oral Capsule Delayed Release Take 1 capsule (40 mg total) by mouth daily. Take 1 capsule by mouth daily before breakfast. (Patient not taking: Reported on 2/25/2025) 90 capsule 3    ergocalciferol 1.25 MG (20718 UT) Oral Cap Take 1 capsule (50,000 Units total) by mouth once a week. 4 capsule 2    ondansetron (ZOFRAN) 4 mg tablet Take 1 tablet (4 mg total) by mouth every 8 (eight) hours as needed for Nausea. 10 tablet 0    Cetirizine HCl 10 MG Oral Cap       Etonogestrel-Ethinyl Estradiol 0.12-0.015 MG/24HR Vaginal Ring INSERT 1 RING VAGINALLY FOR 3 WEEKS THEN REMOVE FOR 1 WEEK 3 Ring 3    Multiple Vitamin (MULTIVITAMINS OR) Take by mouth.     [2]   Allergies  Allergen Reactions    Penicillins HIVES and RASH    Keflex [Cephalexin] RASH

## (undated) DEVICE — DERMABOND CLOSURE 0.7ML TOPICL

## (undated) DEVICE — TROCAR: Brand: KII® SLEEVE

## (undated) DEVICE — LAP CHOLE: Brand: MEDLINE INDUSTRIES, INC.

## (undated) DEVICE — GAMMEX® PI HYBRID SIZE 7.5, STERILE POWDER-FREE SURGICAL GLOVE, POLYISOPRENE AND NEOPRENE BLEND: Brand: GAMMEX

## (undated) DEVICE — UNDYED BRAIDED (POLYGLACTIN 910), SYNTHETIC ABSORBABLE SUTURE: Brand: COATED VICRYL

## (undated) DEVICE — DISPOSABLE LAPAROSCOPIC CLIP APPLIER WITH 20 CLIPS.: Brand: EPIX® UNIVERSAL CLIP APPLIER

## (undated) DEVICE — SUT VICRYL 0 UR-6 J603H

## (undated) DEVICE — MEGADYNE E-Z CLEAN BLADE 2.75"

## (undated) DEVICE — STERILE WATER 1000ML BTL

## (undated) DEVICE — TISSUE RETRIEVAL SYSTEM: Brand: INZII RETRIEVAL SYSTEM

## (undated) DEVICE — SOL NACL IRRIG 0.9% 1000ML BTL

## (undated) DEVICE — [HIGH FLOW INSUFFLATOR,  DO NOT USE IF PACKAGE IS DAMAGED,  KEEP DRY,  KEEP AWAY FROM SUNLIGHT,  PROTECT FROM HEAT AND RADIOACTIVE SOURCES.]: Brand: PNEUMOSURE

## (undated) DEVICE — ABDOMINAL BINDER: Brand: DEROYAL

## (undated) DEVICE — TROCARS: Brand: KII® BALLOON BLUNT TIP SYSTEM

## (undated) DEVICE — TROCAR: Brand: KII FIOS FIRST ENTRY

## (undated) DEVICE — SOL  0.9% 1000ML

## (undated) NOTE — LETTER
5/23/2023          To Whom It May Concern:    Yaquelin Salas is currently under my medical care and may return to work on Monday, June 5th 2023. If you require additional information please contact our office.         Sincerely,    Christ Moreland MD          Document generated by:  Elena Hall RN

## (undated) NOTE — LETTER
6/10/2025          Gwen Abdullahi    [address] [facility] [address]         Dear Gwen,    Our records indicate that the tests ordered for you by CASSIUS Durand  have not been done.  If you have, in fact, already completed the tests or you do not wish to have the tests done, please contact our office at THE NUMBER LISTED BELOW.  Otherwise, please proceed with the testing.  Enclosed is a duplicate order for your convenience.    IMAGING       CT ABDOMEN+PELVIS(CONTRAST ONLY)(CPT=74177) (Order #[id number]) on 2/19/25        Sincerely,    CASSIUS Durand  [address] [id number] [phone]

## (undated) NOTE — LETTER
5/8/2025          Gwen Abdullahi    [address] [facility] [address]         Dear Gwen,    Our records indicate that the tests ordered for you by CASSIUS Durand  have not been done.       CT ABDOMEN+PELVIS(CONTRAST ONLY)(CPT=74177) (Order #[id number]) on 2/19/25        If you have, in fact, already completed the tests or you do not wish to have the tests done, please contact our office at THE NUMBER LISTED BELOW.  Otherwise, please proceed with the testing.  Enclosed is a duplicate order for your convenience.        Sincerely,    CASSIUS Durand  [address]  [facility] [address] [phone]

## (undated) NOTE — LETTER
4/3/2025          Gwen Abdullahi    [facility] [facility] [address]         Dear Gwen,    Our records indicate that the tests ordered for you by CASSIUS Durand  have not been done.  If you have, in fact, already completed the tests or you do not wish to have the tests done, please contact our office at THE NUMBER LISTED BELOW.  Otherwise, please proceed with the testing.     CT ABDOMEN+PELVIS(CONTRAST ONLY)(CPT=74177) (Order #[id number]) on 2/19/25       Sincerely,    CASSIUS Durand  [address]  [facility] [address]  [phone]

## (undated) NOTE — LETTER
Date: 5/8/2023    Patient Name: Baldo Goodrich          To Whom it may concern: This letter has been written at the patient's request. The above patient was seen at the [facility] for treatment of a medical condition. This patient should be excused from attending work from 5/8/23 through 5/11/23. The patient may return to work on 5/12/23 with the following limitations none.         Sincerely,    Jose Hackett, DO

## (undated) NOTE — LETTER
Date & Time: 5/6/2023, 1:12 PM  Patient: John Reyes  Encounter Provider(s):    Yvrose Moe PA-C       To Whom It May Concern:    John Reyes was seen and treated in our department on 5/6/2023. She may return to work 5/9/2023.     If you have any questions or concerns, please do not hesitate to call.        _____________________________  Physician/[facility] Signature